# Patient Record
Sex: FEMALE | Race: WHITE | Employment: UNEMPLOYED | URBAN - NONMETROPOLITAN AREA
[De-identification: names, ages, dates, MRNs, and addresses within clinical notes are randomized per-mention and may not be internally consistent; named-entity substitution may affect disease eponyms.]

---
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
## 2021-09-24 ENCOUNTER — HOSPITAL ENCOUNTER (EMERGENCY)
Age: 21
Discharge: HOME | End: 2021-09-25
Attending: PEDIATRICS
Payer: MEDICAID

## 2021-09-24 VITALS
RESPIRATION RATE: 15 BRPM | DIASTOLIC BLOOD PRESSURE: 67 MMHG | TEMPERATURE: 98.2 F | WEIGHT: 164 LBS | HEART RATE: 106 BPM | HEIGHT: 64 IN | BODY MASS INDEX: 28 KG/M2 | OXYGEN SATURATION: 96 % | SYSTOLIC BLOOD PRESSURE: 139 MMHG

## 2021-09-24 DIAGNOSIS — S09.90XA INJURY OF HEAD, INITIAL ENCOUNTER: Primary | ICD-10-CM

## 2021-09-24 LAB
BILIRUBIN URINE: NEGATIVE
BLOOD, URINE: NEGATIVE
CLARITY: CLEAR
COLOR: YELLOW
GLUCOSE URINE: NEGATIVE MG/DL
HCG(URINE) PREGNANCY TEST: NEGATIVE
KETONES, URINE: NEGATIVE MG/DL
LEUKOCYTE ESTERASE, URINE: NEGATIVE
NITRITE, URINE: NEGATIVE
PH UA: 5.5 (ref 5–8)
PROTEIN UA: NEGATIVE MG/DL
SPECIFIC GRAVITY UA: 1.01 (ref 1–1.03)
UROBILINOGEN, URINE: 0.2 E.U./DL

## 2021-09-24 PROCEDURE — 99283 EMERGENCY DEPT VISIT LOW MDM: CPT

## 2021-09-24 PROCEDURE — 84703 CHORIONIC GONADOTROPIN ASSAY: CPT

## 2021-09-24 PROCEDURE — 81003 URINALYSIS AUTO W/O SCOPE: CPT

## 2021-09-24 PROCEDURE — 96365 THER/PROPH/DIAG IV INF INIT: CPT

## 2021-09-24 PROCEDURE — 6360000002 HC RX W HCPCS: Performed by: PEDIATRICS

## 2021-09-24 PROCEDURE — 2500000003 HC RX 250 WO HCPCS: Performed by: PEDIATRICS

## 2021-09-24 PROCEDURE — 96366 THER/PROPH/DIAG IV INF ADDON: CPT

## 2021-09-24 PROCEDURE — 96375 TX/PRO/DX INJ NEW DRUG ADDON: CPT

## 2021-09-24 PROCEDURE — 2580000003 HC RX 258: Performed by: PEDIATRICS

## 2021-09-24 PROCEDURE — 96372 THER/PROPH/DIAG INJ SC/IM: CPT

## 2021-09-24 RX ORDER — HYDROXYZINE HYDROCHLORIDE 25 MG/ML
25 INJECTION, SOLUTION INTRAMUSCULAR ONCE
Status: COMPLETED | OUTPATIENT
Start: 2021-09-24 | End: 2021-09-24

## 2021-09-24 RX ORDER — PAROXETINE 10 MG/1
10 TABLET, FILM COATED ORAL EVERY MORNING
COMMUNITY
Start: 2021-07-01

## 2021-09-24 RX ORDER — NORETHINDRONE ACETATE AND ETHINYL ESTRADIOL AND FERROUS FUMARATE 1MG-20(24)
KIT ORAL
COMMUNITY

## 2021-09-24 RX ORDER — LAMOTRIGINE 100 MG/1
100 TABLET ORAL DAILY
COMMUNITY

## 2021-09-24 RX ORDER — DIAZEPAM 10 MG/1
10 TABLET ORAL 2 TIMES DAILY PRN
COMMUNITY

## 2021-09-24 RX ORDER — ONDANSETRON 2 MG/ML
4 INJECTION INTRAMUSCULAR; INTRAVENOUS ONCE
Status: COMPLETED | OUTPATIENT
Start: 2021-09-24 | End: 2021-09-24

## 2021-09-24 RX ORDER — ZIPRASIDONE HYDROCHLORIDE 20 MG/1
20 CAPSULE ORAL 2 TIMES DAILY WITH MEALS
COMMUNITY

## 2021-09-24 RX ADMIN — HYDROXYZINE HYDROCHLORIDE 25 MG: 25 INJECTION, SOLUTION INTRAMUSCULAR at 23:22

## 2021-09-24 RX ADMIN — FOLIC ACID: 5 INJECTION, SOLUTION INTRAMUSCULAR; INTRAVENOUS; SUBCUTANEOUS at 23:23

## 2021-09-24 RX ADMIN — ONDANSETRON HYDROCHLORIDE 4 MG: 2 SOLUTION INTRAMUSCULAR; INTRAVENOUS at 23:17

## 2021-09-24 ASSESSMENT — PAIN DESCRIPTION - LOCATION: LOCATION: HEAD;CHEST

## 2021-09-24 ASSESSMENT — PAIN SCALES - GENERAL: PAINLEVEL_OUTOF10: 8

## 2021-09-24 ASSESSMENT — PAIN DESCRIPTION - DESCRIPTORS: DESCRIPTORS: PATIENT UNABLE TO DESCRIBE

## 2021-09-24 NOTE — ED TRIAGE NOTES
Fall at [facility] ER after having a \"panic attack and passing out\" Pt states the nurse at [facility] told that dr that she didn't pass out and hit her head, and a ct scan was not ordered. Pt c/o vomiting and nosebleed since, no nose bleed currently.  Pt  advised pt to come here for treatment

## 2021-09-25 PROCEDURE — 96366 THER/PROPH/DIAG IV INF ADDON: CPT

## 2021-09-27 ASSESSMENT — ENCOUNTER SYMPTOMS
BACK PAIN: 0
NAUSEA: 1
ABDOMINAL PAIN: 0
SHORTNESS OF BREATH: 0
COLOR CHANGE: 0
VOMITING: 1
EYE DISCHARGE: 0
COUGH: 0
RHINORRHEA: 0

## 2021-09-28 NOTE — ED PROVIDER NOTES
[facility]  eMERGENCY dEPARTMENT eNCOUnter      Pt Name: Hamilton Lundy  MRN: [id number]  [place] 2000  Date of evaluation: 9/24/2021  Provider: Tena Santos MD    CHIEF COMPLAINT       Chief Complaint   Patient presents with    Fall     see note         HISTORY OF PRESENT ILLNESS   (Location/Symptom, Timing/Onset,Context/Setting, Quality, Duration, Modifying Factors, Severity)  Note limiting factors. Hamilton Lundy is a 24 y.o. female who presents to the emergency department after fall. Patient states that she became dizzy while at [facility] and fell. This happened approximately 6 hours ago. Patient states that she hurts through the right side of her head. Patient states that she became anxious, dizzy, her vision went white prior to her falling. Patient denies passing out. Patient states she has been having nausea and vomiting x2. Patient states she remains anxious. Patient has been in the emergency department here for the past 4 hours. Patient denies difficulty walking or speaking, passing out, chest pain, difficulty breathing, or persistent changes in vision. HPI    NursingNotes were reviewed. REVIEW OF SYSTEMS    (2-9 systems for level 4, 10 or more for level 5)     Review of Systems   Constitutional: Negative for chills and fever. HENT: Negative for congestion and rhinorrhea. Eyes: Positive for visual disturbance (Transient. ). Negative for discharge. Respiratory: Negative for cough and shortness of breath. Cardiovascular: Negative for chest pain and palpitations. Gastrointestinal: Positive for nausea and vomiting. Negative for abdominal pain. Genitourinary: Negative for difficulty urinating and dysuria. Musculoskeletal: Negative for back pain and neck pain. Skin: Negative for color change and rash. Neurological: Positive for headaches. Negative for syncope and light-headedness.    Psychiatric/Behavioral: Negative for agitation, confusion and suicidal ideas. The patient is nervous/anxious. All other systems reviewed and are negative. PAST MEDICALHISTORY     Past Medical History:   Diagnosis Date    Bipolar 1 disorder ([address]     Depression     Endometriosis     Panic anxiety syndrome     PTSD (post-traumatic stress disorder)          SURGICAL HISTORY       Past Surgical History:   Procedure Laterality Date    ABDOMINAL EXPLORATION SURGERY           CURRENT MEDICATIONS     Discharge Medication List as of 9/25/2021  1:06 AM      CONTINUE these medications which have NOT CHANGED    Details   PARoxetine (PAXIL) 10 MG tablet Take 10 mg by mouth every morningHistorical Med      melatonin 3 MG TABS tablet Take 1 tablet by mouth nightly, Disp-30 tablet, R-1Normal      Norethin Ace-Eth Estrad-FE 1-20 MG-MCG(24) TABS Take by mouthHistorical Med      diazePAM (VALIUM) 10 MG tablet Take 10 mg by mouth 2 times daily as needed. Historical Med      lamoTRIgine (LAMICTAL) 100 MG tablet Take 100 mg by mouth dailyHistorical Med      ziprasidone (GEODON) 20 MG capsule Take 20 mg by mouth 2 times daily (with meals)Historical Med             ALLERGIES     Sulfamethoxazole-trimethoprim    FAMILY HISTORY     History reviewed. No pertinent family history.        SOCIAL HISTORY       Social History     Socioeconomic History    Marital status: Single     Spouse name: None    Number of children: None    Years of education: None    Highest education level: None   Occupational History    None   Tobacco Use    Smoking status: Current Every Day Smoker     Packs/day: 0.50     Types: E-Cigarettes, Cigarettes    Smokeless tobacco: Never Used   Substance and Sexual Activity    Alcohol use: Yes     Comment: rarely    Drug use: Not Currently    Sexual activity: Yes     Partners: Male   Other Topics Concern    None   Social History Narrative    None     Social Determinants of Health     Financial Resource Strain:     Difficulty of Paying Living Expenses: Food Insecurity:     Worried About Running Out of Food in the Last Year:     [address] in the Last Year:    Transportation Needs:     Lack of Transportation (Medical):  Lack of Transportation (Non-Medical):    Physical Activity:     Days of Exercise per Week:     Minutes of Exercise per Session:    Stress:     Feeling of Stress :    Social Connections:     Frequency of Communication with Friends and Family:     Frequency of Social Gatherings with Friends and Family:     Attends [employer] Services:     Active Member of Clubs or Organizations:     Attends Club or Organization Meetings:     Marital Status:    Intimate Partner Violence:     Fear of Current or Ex-Partner:     Emotionally Abused:     Physically Abused:     Sexually Abused:        SCREENINGS    Solo Coma Scale  Eye Opening: Spontaneous  Best Verbal Response: Oriented  Best Motor Response: Obeys commands  Solo Coma Scale Score: 15        PHYSICAL EXAM    (up to 7 for level 4, 8 or more for level 5)     ED Triage Vitals [09/24/21 1802]   BP Temp Temp Source Pulse Resp SpO2 Height Weight   139/67 98.2 °F (36.8 °C) Oral 106 15 96 % 5' 4\" (1.626 m) 164 lb (74.4 kg)       Physical Exam  Vitals and nursing note reviewed. Constitutional:       General: She is not in acute distress. Appearance: Normal appearance. HENT:      Head: Normocephalic. Comments: Contusion with minimal swelling overlying right parietal temporal region. No palpable deformity. Right Ear: External ear normal.      Left Ear: External ear normal.      Nose: Nose normal.      Mouth/Throat:      Mouth: Mucous membranes are moist.      Pharynx: Oropharynx is clear. No oropharyngeal exudate. Eyes:      General: No scleral icterus. Conjunctiva/sclera: Conjunctivae normal.      Pupils: Pupils are equal, round, and reactive to light. Cardiovascular:      Rate and Rhythm: Normal rate and regular rhythm. Pulses: Normal pulses.       Heart sounds: Normal heart sounds. Pulmonary:      Effort: Pulmonary effort is normal.      Breath sounds: Normal breath sounds. Abdominal:      General: Bowel sounds are normal.      Palpations: Abdomen is soft. Tenderness: There is no abdominal tenderness. There is no guarding. Musculoskeletal:         General: No tenderness or deformity. Cervical back: Neck supple. No rigidity. Skin:     General: Skin is warm and dry. Capillary Refill: Capillary refill takes less than 2 seconds. Coloration: Skin is not jaundiced. Neurological:      General: No focal deficit present. Mental Status: She is alert and oriented to person, place, and time. Mental status is at baseline. Coordination: Coordination normal.   Psychiatric:         Mood and Affect: Mood normal.         Behavior: Behavior normal.         DIAGNOSTIC RESULTS     RADIOLOGY:  Non-plain film images such as CT, Ultrasound and MRI are read by the radiologist. Plain radiographic images are visualized and preliminarily interpreted bythe emergency physician with the below findings:          No orders to display           LABS:  Labs Reviewed   PREGNANCY, URINE   URINE RT REFLEX TO CULTURE       All other labs were within normal range or not returned as of this dictation. EMERGENCY DEPARTMENT COURSE and DIFFERENTIAL DIAGNOSIS/MDM:   Vitals:    Vitals:    09/24/21 1802 09/24/21 2004   BP: 139/67    Pulse: 106 106   Resp: 15    Temp: 98.2 °F (36.8 °C)    TempSrc: Oral    SpO2: 96%    Weight: 164 lb (74.4 kg)    Height: 5' 4\" (1.626 m)        MDM  59-year-old female presents with right scalp contusion status post fall at [facility].  Lab results reviewed. Patient states that she believes she will get better after a banana bag IV. This was ordered along with fluids and Zofran. Patient states that she felt much better and was discharged to home.   CT was avoided at this time as risk of radiation to patient's brain outweigh benefit of CT scan. Patient is completely neurologically intact at this time. Patient will return with increasing or severe pain, difficulty walking or speaking, or other concerns. Patient verbalizes understanding and agreement with plan of care. CONSULTS:  None    PROCEDURES:  Unless otherwise noted below, none     Procedures    FINAL IMPRESSION      1.  Injury of head, initial encounter          DISPOSITION/PLAN   DISPOSITION Decision To Discharge 09/25/2021 01:05:43 AM      PATIENT REFERRED TO:  Diane Escamilla MD  [facility]  [phone]    Schedule an appointment as soon as possible for a visit         DISCHARGE MEDICATIONS:  Discharge Medication List as of 9/25/2021  1:06 AM             (Please note that portions of this note were completed with a voice recognition program.  Efforts were made to edit thedictations but occasionally words are mis-transcribed.)    Snow Dawson MD (electronically signed)  Attending Emergency Physician          Snow Dawson MD  09/27/21 9952

## 2024-01-30 ENCOUNTER — TELEPHONE (OUTPATIENT)
Dept: FAMILY MEDICINE CLINIC | Facility: CLINIC | Age: 24
End: 2024-01-30

## 2024-01-30 NOTE — TELEPHONE ENCOUNTER
Just wanting to verify, we would be able to see patient once she is changed to [employer] but we would not be able to see  daughter once she is born due to being new Medicaid patient

## 2024-01-30 NOTE — TELEPHONE ENCOUNTER
Caller: Xiomara Staples    Relationship: Self    Best call back number: [phone]     What is the best time to reach you: ANY    Who are you requesting to speak with (clinical staff, provider,  specific staff member): CLINICAL    What was the call regarding: CALLER STATED THAT HER INSURANCE WILL NO LONGER BE COVERED AT THIS OFFICE AND SHE WANTS A RECOMMENDATION FROM DR. LAGUNAS ON WHO SHE SHOULD SEE IN THE AREA FOR HER AND HER DAUGHTER WHO IS DUE IN MAY? PATIENT STATED THAT [employer], [employer] AND [place] [employer] ARE HER OPTIONS THAT SHE IS LOOKING AT RIGHT NOW. PATIENT WANTS TO KNOW SINCE SHE IS NOT A NEW PATIENT CAN WE STILL SEE HER  WITH [employer] MEDICAID? PATIENT STATED WHILE ON THE PHONE THAT SHE SIGNED UP FOR [employer] AND SAID THAT DR. LAGUNAS WAS IN NETWORK AND START DATE IS 3.1.24.    Is it okay if the provider responds through [facility]: NO, PLEASE CALL

## 2024-02-01 ENCOUNTER — APPOINTMENT (OUTPATIENT)
Dept: MRI IMAGING | Facility: HOSPITAL | Age: 24
End: 2024-02-01
Payer: COMMERCIAL

## 2024-02-01 ENCOUNTER — APPOINTMENT (OUTPATIENT)
Dept: NEUROLOGY | Facility: HOSPITAL | Age: 24
End: 2024-02-01
Payer: COMMERCIAL

## 2024-02-01 ENCOUNTER — HOSPITAL ENCOUNTER (OUTPATIENT)
Facility: HOSPITAL | Age: 24
Setting detail: OBSERVATION
Discharge: HOME | End: 2024-02-03
Attending: FAMILY MEDICINE | Admitting: FAMILY MEDICINE
Payer: COMMERCIAL

## 2024-02-01 ENCOUNTER — APPOINTMENT (OUTPATIENT)
Dept: CT IMAGING | Facility: HOSPITAL | Age: 24
End: 2024-02-01
Payer: COMMERCIAL

## 2024-02-01 DIAGNOSIS — R56.9 SEIZURE: Primary | ICD-10-CM

## 2024-02-01 DIAGNOSIS — Z34.90 PREGNANCY, UNSPECIFIED GESTATIONAL AGE: ICD-10-CM

## 2024-02-01 PROBLEM — Z34.92 SECOND TRIMESTER PREGNANCY: Status: ACTIVE | Noted: 2024-02-01

## 2024-02-01 PROBLEM — E87.6 HYPOKALEMIA: Status: ACTIVE | Noted: 2024-02-01

## 2024-02-01 LAB
ALBUMIN SERPL-MCNC: 3.4 G/DL (ref 3.5–5.2)
ALBUMIN/GLOB SERPL: 1.3 G/DL
ALP SERPL-CCNC: 57 U/L (ref 39–117)
ALT SERPL W P-5'-P-CCNC: 10 U/L (ref 1–33)
ANION GAP SERPL CALCULATED.3IONS-SCNC: 10 MMOL/L (ref 5–15)
APTT PPP: 25.3 SECONDS (ref 24.5–36)
AST SERPL-CCNC: 14 U/L (ref 1–32)
BACTERIA UR QL AUTO: ABNORMAL /HPF
BASOPHILS # BLD AUTO: 0.01 10*3/MM3 (ref 0–0.2)
BASOPHILS NFR BLD AUTO: 0.1 % (ref 0–1.5)
BILIRUB SERPL-MCNC: <0.2 MG/DL (ref 0–1.2)
BILIRUB UR QL STRIP: NEGATIVE
BUN SERPL-MCNC: 5 MG/DL (ref 6–20)
BUN/CREAT SERPL: 15.6 (ref 7–25)
CALCIUM SPEC-SCNC: 7.9 MG/DL (ref 8.6–10.5)
CHLORIDE SERPL-SCNC: 106 MMOL/L (ref 98–107)
CLARITY UR: CLEAR
CO2 SERPL-SCNC: 23 MMOL/L (ref 22–29)
COLOR UR: YELLOW
CREAT SERPL-MCNC: 0.32 MG/DL (ref 0.57–1)
DEPRECATED RDW RBC AUTO: 45.1 FL (ref 37–54)
EGFRCR SERPLBLD CKD-EPI 2021: 150.7 ML/MIN/1.73
EOSINOPHIL # BLD AUTO: 0.06 10*3/MM3 (ref 0–0.4)
EOSINOPHIL NFR BLD AUTO: 0.7 % (ref 0.3–6.2)
ERYTHROCYTE [DISTWIDTH] IN BLOOD BY AUTOMATED COUNT: 13.8 % (ref 12.3–15.4)
GLOBULIN UR ELPH-MCNC: 2.6 GM/DL
GLUCOSE SERPL-MCNC: 75 MG/DL (ref 65–99)
GLUCOSE UR STRIP-MCNC: NEGATIVE MG/DL
HCT VFR BLD AUTO: 30.2 % (ref 34–46.6)
HGB BLD-MCNC: 9.9 G/DL (ref 12–15.9)
HGB UR QL STRIP.AUTO: NEGATIVE
HYALINE CASTS UR QL AUTO: ABNORMAL /LPF
IMM GRANULOCYTES # BLD AUTO: 0.04 10*3/MM3 (ref 0–0.05)
IMM GRANULOCYTES NFR BLD AUTO: 0.4 % (ref 0–0.5)
INR PPP: 0.97 (ref 0.91–1.09)
KETONES UR QL STRIP: NEGATIVE
LEUKOCYTE ESTERASE UR QL STRIP.AUTO: ABNORMAL
LYMPHOCYTES # BLD AUTO: 1.56 10*3/MM3 (ref 0.7–3.1)
LYMPHOCYTES NFR BLD AUTO: 17.4 % (ref 19.6–45.3)
MAGNESIUM SERPL-MCNC: 2 MG/DL (ref 1.6–2.6)
MCH RBC QN AUTO: 29.1 PG (ref 26.6–33)
MCHC RBC AUTO-ENTMCNC: 32.8 G/DL (ref 31.5–35.7)
MCV RBC AUTO: 88.8 FL (ref 79–97)
MONOCYTES # BLD AUTO: 0.65 10*3/MM3 (ref 0.1–0.9)
MONOCYTES NFR BLD AUTO: 7.3 % (ref 5–12)
NEUTROPHILS NFR BLD AUTO: 6.62 10*3/MM3 (ref 1.7–7)
NEUTROPHILS NFR BLD AUTO: 74.1 % (ref 42.7–76)
NITRITE UR QL STRIP: NEGATIVE
NRBC BLD AUTO-RTO: 0 /100 WBC (ref 0–0.2)
PH UR STRIP.AUTO: 8 [PH] (ref 5–8)
PLATELET # BLD AUTO: 262 10*3/MM3 (ref 140–450)
PMV BLD AUTO: 9.9 FL (ref 6–12)
POTASSIUM SERPL-SCNC: 3 MMOL/L (ref 3.5–5.2)
PROT SERPL-MCNC: 6 G/DL (ref 6–8.5)
PROT UR QL STRIP: ABNORMAL
PROTHROMBIN TIME: 13 SECONDS (ref 11.8–14.8)
QT INTERVAL: 368 MS
QTC INTERVAL: 452 MS
RBC # BLD AUTO: 3.4 10*6/MM3 (ref 3.77–5.28)
RBC # UR STRIP: ABNORMAL /HPF
REF LAB TEST METHOD: ABNORMAL
SODIUM SERPL-SCNC: 139 MMOL/L (ref 136–145)
SP GR UR STRIP: 1.02 (ref 1–1.03)
SQUAMOUS #/AREA URNS HPF: ABNORMAL /HPF
TROPONIN T SERPL HS-MCNC: <6 NG/L
UROBILINOGEN UR QL STRIP: ABNORMAL
WBC # UR STRIP: ABNORMAL /HPF
WBC NRBC COR # BLD AUTO: 8.94 10*3/MM3 (ref 3.4–10.8)

## 2024-02-01 PROCEDURE — 85730 THROMBOPLASTIN TIME PARTIAL: CPT | Performed by: FAMILY MEDICINE

## 2024-02-01 PROCEDURE — G0378 HOSPITAL OBSERVATION PER HR: HCPCS

## 2024-02-01 PROCEDURE — 70544 MR ANGIOGRAPHY HEAD W/O DYE: CPT

## 2024-02-01 PROCEDURE — 70551 MRI BRAIN STEM W/O DYE: CPT

## 2024-02-01 PROCEDURE — 95714 VEEG EA 12-26 HR UNMNTR: CPT

## 2024-02-01 PROCEDURE — 99285 EMERGENCY DEPT VISIT HI MDM: CPT

## 2024-02-01 PROCEDURE — 85610 PROTHROMBIN TIME: CPT | Performed by: FAMILY MEDICINE

## 2024-02-01 PROCEDURE — 95816 EEG AWAKE AND DROWSY: CPT

## 2024-02-01 PROCEDURE — 36415 COLL VENOUS BLD VENIPUNCTURE: CPT

## 2024-02-01 PROCEDURE — 93005 ELECTROCARDIOGRAM TRACING: CPT

## 2024-02-01 PROCEDURE — 84484 ASSAY OF TROPONIN QUANT: CPT | Performed by: FAMILY MEDICINE

## 2024-02-01 PROCEDURE — 63710000001 DIPHENHYDRAMINE PER 50 MG: Performed by: INTERNAL MEDICINE

## 2024-02-01 PROCEDURE — 81001 URINALYSIS AUTO W/SCOPE: CPT | Performed by: FAMILY MEDICINE

## 2024-02-01 PROCEDURE — 25810000003 SODIUM CHLORIDE 0.9 % SOLUTION: Performed by: FAMILY MEDICINE

## 2024-02-01 PROCEDURE — 70547 MR ANGIOGRAPHY NECK W/O DYE: CPT

## 2024-02-01 PROCEDURE — 80053 COMPREHEN METABOLIC PANEL: CPT | Performed by: FAMILY MEDICINE

## 2024-02-01 PROCEDURE — 83735 ASSAY OF MAGNESIUM: CPT | Performed by: FAMILY MEDICINE

## 2024-02-01 PROCEDURE — 70450 CT HEAD/BRAIN W/O DYE: CPT

## 2024-02-01 PROCEDURE — 99214 OFFICE O/P EST MOD 30 MIN: CPT | Performed by: PSYCHIATRY & NEUROLOGY

## 2024-02-01 PROCEDURE — 95816 EEG AWAKE AND DROWSY: CPT | Performed by: PSYCHIATRY & NEUROLOGY

## 2024-02-01 PROCEDURE — 99204 OFFICE O/P NEW MOD 45 MIN: CPT | Performed by: OBSTETRICS & GYNECOLOGY

## 2024-02-01 PROCEDURE — 95720 EEG PHY/QHP EA INCR W/VEEG: CPT | Performed by: PSYCHIATRY & NEUROLOGY

## 2024-02-01 PROCEDURE — 85025 COMPLETE CBC W/AUTO DIFF WBC: CPT | Performed by: FAMILY MEDICINE

## 2024-02-01 RX ORDER — SODIUM CHLORIDE 9 MG/ML
40 INJECTION, SOLUTION INTRAVENOUS AS NEEDED
Status: DISCONTINUED | OUTPATIENT
Start: 2024-02-01 | End: 2024-02-03 | Stop reason: HOSPADM

## 2024-02-01 RX ORDER — SODIUM CHLORIDE 0.9 % (FLUSH) 0.9 %
10 SYRINGE (ML) INJECTION EVERY 12 HOURS SCHEDULED
Status: DISCONTINUED | OUTPATIENT
Start: 2024-02-01 | End: 2024-02-03 | Stop reason: HOSPADM

## 2024-02-01 RX ORDER — PRENATAL VIT/IRON FUM/FOLIC AC 27MG-0.8MG
1 TABLET ORAL DAILY
Status: DISCONTINUED | OUTPATIENT
Start: 2024-02-02 | End: 2024-02-03 | Stop reason: HOSPADM

## 2024-02-01 RX ORDER — ASPIRIN 81 MG/1
81 TABLET ORAL DAILY
Status: DISCONTINUED | OUTPATIENT
Start: 2024-02-02 | End: 2024-02-03 | Stop reason: HOSPADM

## 2024-02-01 RX ORDER — SODIUM CHLORIDE 0.9 % (FLUSH) 0.9 %
10 SYRINGE (ML) INJECTION AS NEEDED
Status: DISCONTINUED | OUTPATIENT
Start: 2024-02-01 | End: 2024-02-03 | Stop reason: HOSPADM

## 2024-02-01 RX ORDER — HYDROXYZINE HYDROCHLORIDE 25 MG/1
50 TABLET, FILM COATED ORAL EVERY 6 HOURS PRN
Status: DISCONTINUED | OUTPATIENT
Start: 2024-02-01 | End: 2024-02-03 | Stop reason: HOSPADM

## 2024-02-01 RX ORDER — FERROUS SULFATE 325(65) MG
325 TABLET ORAL 2 TIMES DAILY WITH MEALS
Status: DISCONTINUED | OUTPATIENT
Start: 2024-02-01 | End: 2024-02-03 | Stop reason: HOSPADM

## 2024-02-01 RX ORDER — FAMOTIDINE 20 MG/1
20 TABLET, FILM COATED ORAL
Status: DISCONTINUED | OUTPATIENT
Start: 2024-02-02 | End: 2024-02-03 | Stop reason: HOSPADM

## 2024-02-01 RX ORDER — ACETAMINOPHEN 325 MG/1
650 TABLET ORAL EVERY 4 HOURS PRN
Status: DISCONTINUED | OUTPATIENT
Start: 2024-02-01 | End: 2024-02-03 | Stop reason: HOSPADM

## 2024-02-01 RX ORDER — DOCUSATE SODIUM 100 MG/1
100 CAPSULE, LIQUID FILLED ORAL 2 TIMES DAILY
Status: DISCONTINUED | OUTPATIENT
Start: 2024-02-01 | End: 2024-02-03 | Stop reason: HOSPADM

## 2024-02-01 RX ORDER — POTASSIUM CHLORIDE 750 MG/1
40 CAPSULE, EXTENDED RELEASE ORAL EVERY 4 HOURS
Status: COMPLETED | OUTPATIENT
Start: 2024-02-01 | End: 2024-02-02

## 2024-02-01 RX ORDER — SODIUM CHLORIDE 9 MG/ML
50 INJECTION, SOLUTION INTRAVENOUS CONTINUOUS
Status: DISCONTINUED | OUTPATIENT
Start: 2024-02-01 | End: 2024-02-03 | Stop reason: HOSPADM

## 2024-02-01 RX ORDER — ASCORBIC ACID 500 MG
500 TABLET ORAL DAILY
Status: DISCONTINUED | OUTPATIENT
Start: 2024-02-02 | End: 2024-02-03 | Stop reason: HOSPADM

## 2024-02-01 RX ORDER — SUCRALFATE 1 G/1
1 TABLET ORAL 4 TIMES DAILY
Status: DISCONTINUED | OUTPATIENT
Start: 2024-02-01 | End: 2024-02-03 | Stop reason: HOSPADM

## 2024-02-01 RX ORDER — DIPHENHYDRAMINE HCL 25 MG
25 CAPSULE ORAL ONCE
Status: COMPLETED | OUTPATIENT
Start: 2024-02-01 | End: 2024-02-01

## 2024-02-01 RX ADMIN — Medication 10 ML: at 21:09

## 2024-02-01 RX ADMIN — FERROUS SULFATE TAB 325 MG (65 MG ELEMENTAL FE) 325 MG: 325 (65 FE) TAB at 23:19

## 2024-02-01 RX ADMIN — POTASSIUM CHLORIDE 40 MEQ: 10 CAPSULE, EXTENDED RELEASE ORAL at 21:08

## 2024-02-01 RX ADMIN — SODIUM CHLORIDE 50 ML/HR: 9 INJECTION, SOLUTION INTRAVENOUS at 21:59

## 2024-02-01 RX ADMIN — ACETAMINOPHEN 650 MG: 325 TABLET, FILM COATED ORAL at 21:09

## 2024-02-01 RX ADMIN — DIPHENHYDRAMINE HYDROCHLORIDE 25 MG: 25 CAPSULE ORAL at 23:19

## 2024-02-01 RX ADMIN — DOCUSATE SODIUM 100 MG: 100 CAPSULE, LIQUID FILLED ORAL at 21:09

## 2024-02-01 RX ADMIN — SUCRALFATE 1 G: 1 TABLET ORAL at 22:00

## 2024-02-01 NOTE — ED PROVIDER NOTES
Subjective   History of Present Illness  23-year-old female states that she has had 3 episodes over the last couple weeks.  She states that she gets these episodes where she is talking to someone and she will start speaking nonsense and not making sense.  Patient has no loss of consciousness.  Patient has no convulsions.  Patient states that her OB thought she might be having focal seizures and recommended she come to the emergency room for evaluation.  Patient states that today she was driving and she was talking to someone on the phone and then she just was not speaking to them while she was driving and then she states that she got back to talking after a few seconds and then came to the emergency room for evaluation.  Patient currently denies any symptoms at this time.  Patient states that she does still take Klonopin but she does not take it as prescribed.  Patient states that she does take small amounts as needed.      Review of Systems   All other systems reviewed and are negative.      Past Medical History:   Diagnosis Date    ADHD (attention deficit hyperactivity disorder)     Allergic     Anxiety     Asthma     Bipolar 1 disorder     Bulimia nervosa     COVID     Depression     Endometriosis     Endometriosis     GERD (gastroesophageal reflux disease)     History of medical problems     Panic disorder     PCOS (polycystic ovarian syndrome)     PTSD (post-traumatic stress disorder)     Urinary tract infection        Allergies   Allergen Reactions    Bactrim [Sulfamethoxazole-Trimethoprim] Rash    Sulfa Antibiotics Rash       Past Surgical History:   Procedure Laterality Date    ENDOMETRIAL ABLATION      EXPLORATORY LAPAROTOMY         Family History   Problem Relation Age of Onset    Suicidality Mother     No Known Problems Father        Social History     Socioeconomic History    Marital status:    Tobacco Use    Smoking status: Light Smoker     Types: Electronic Cigarette    Smokeless tobacco: Never    Substance and Sexual Activity    Alcohol use: Not Currently     Comment: Rarely    Drug use: No    Sexual activity: Yes     Partners: Female     Birth control/protection: None           Objective   Physical Exam  Vitals and nursing note reviewed.   Constitutional:       Appearance: Normal appearance.   HENT:      Head: Normocephalic and atraumatic.      Mouth/Throat:      Mouth: Mucous membranes are moist.   Eyes:      Extraocular Movements: Extraocular movements intact.      Pupils: Pupils are equal, round, and reactive to light.   Cardiovascular:      Rate and Rhythm: Normal rate and regular rhythm.      Heart sounds: Normal heart sounds.   Pulmonary:      Effort: Pulmonary effort is normal.      Breath sounds: Normal breath sounds.   Abdominal:      General: Bowel sounds are normal.      Palpations: Abdomen is soft.   Musculoskeletal:      Cervical back: Normal range of motion and neck supple. No tenderness.   Skin:     General: Skin is warm and dry.   Neurological:      General: No focal deficit present.      Mental Status: She is alert and oriented to person, place, and time.      Cranial Nerves: No cranial nerve deficit.      Sensory: No sensory deficit.      Motor: No weakness.      Coordination: Coordination normal.   Psychiatric:         Mood and Affect: Mood normal.         Behavior: Behavior normal.         Procedures           ED Course                                             Medical Decision Making  23-year-old female came in for questionable seizure-like activity.  Case was discussed with neurology who recommended admitting the patient for further observation.  I discussed the case with Dr. Bal Edmond who accepted the patient under their services.    Problems Addressed:  Pregnancy, unspecified gestational age: complicated acute illness or injury  Seizure: complicated acute illness or injury    Amount and/or Complexity of Data Reviewed  Labs: ordered. Decision-making details documented in ED  Course.  Radiology: ordered. Decision-making details documented in ED Course.  ECG/medicine tests: ordered. Decision-making details documented in ED Course.    Risk  Decision regarding hospitalization.      Lab Results (last 24 hours)       Procedure Component Value Units Date/Time    Urinalysis With Culture If Indicated - Urine, Clean Catch [[id number]]  (Abnormal) Collected: 02/01/24 1331    Specimen: Urine, Clean Catch Updated: 02/01/24 1353     Color, UA Yellow     Appearance, UA Clear     pH, UA 8.0     Specific Gravity, UA 1.021     Glucose, UA Negative     Ketones, UA Negative     Bilirubin, UA Negative     Blood, UA Negative     Protein, UA Trace     Leuk Esterase, UA Trace     Nitrite, UA Negative     Urobilinogen, UA 1.0 E.U./dL    Narrative:      In absence of clinical symptoms, the presence of pyuria, bacteria, and/or nitrites on the urinalysis result does not correlate with infection.    Urinalysis, Microscopic Only - Urine, Clean Catch [[id number]]  (Abnormal) Collected: 02/01/24 1331    Specimen: Urine, Clean Catch Updated: 02/01/24 1353     RBC, UA 0-2 /HPF      WBC, UA 0-2 /HPF      Comment: Urine culture not indicated.        Bacteria, UA 2+ /HPF      Squamous Epithelial Cells, UA 3-6 /HPF      Hyaline Casts, UA 3-6 /LPF      Methodology Automated Microscopy    CBC & Differential [[id number]]  (Abnormal) Collected: 02/01/24 1334    Specimen: Blood Updated: 02/01/24 1350    Narrative:      The following orders were created for panel order CBC & Differential.  Procedure                               Abnormality         Status                     ---------                               -----------         ------                     CBC Auto Differential[[id number]]        Abnormal            Final result                 Please view results for these tests on the individual orders.    Comprehensive Metabolic Panel [[id number]]  (Abnormal) Collected: 02/01/24 1334    Specimen: Blood Updated: 02/01/24 6735      Glucose 75 mg/dL      BUN 5 mg/dL      Creatinine 0.32 mg/dL      Sodium 139 mmol/L      Potassium 3.0 mmol/L      Chloride 106 mmol/L      CO2 23.0 mmol/L      Calcium 7.9 mg/dL      Total Protein 6.0 g/dL      Albumin 3.4 g/dL      ALT (SGPT) 10 U/L      AST (SGOT) 14 U/L      Alkaline Phosphatase 57 U/L      Total Bilirubin <0.2 mg/dL      Globulin 2.6 gm/dL      A/G Ratio 1.3 g/dL      BUN/Creatinine Ratio 15.6     Anion Gap 10.0 mmol/L      eGFR 150.7 mL/min/1.73     Narrative:      GFR Normal >60  Chronic Kidney Disease <60  Kidney Failure <15      Protime-INR [[id number]]  (Normal) Collected: 02/01/24 1334    Specimen: Blood Updated: 02/01/24 1508     Protime 13.0 Seconds      INR 0.97    aPTT [[id number]]  (Normal) Collected: 02/01/24 1334    Specimen: Blood Updated: 02/01/24 1508     PTT 25.3 seconds     Single High Sensitivity Troponin T [[id number]]  (Normal) Collected: 02/01/24 1334    Specimen: Blood Updated: 02/01/24 1411     HS Troponin T <6 ng/L     Narrative:      High Sensitive Troponin T Reference Range:  <14.0 ng/L- Negative Female for AMI  <22.0 ng/L- Negative Male for AMI  >=14 - Abnormal Female indicating possible myocardial injury.  >=22 - Abnormal Male indicating possible myocardial injury.   Clinicians would have to utilize clinical acumen, EKG, Troponin, and serial changes to determine if it is an Acute Myocardial Infarction or myocardial injury due to an underlying chronic condition.         Magnesium [[id number]]  (Normal) Collected: 02/01/24 1334    Specimen: Blood Updated: 02/01/24 1413     Magnesium 2.0 mg/dL     CBC Auto Differential [[id number]]  (Abnormal) Collected: 02/01/24 1334    Specimen: Blood Updated: 02/01/24 1350     WBC 8.94 10*3/mm3      RBC 3.40 10*6/mm3      Hemoglobin 9.9 g/dL      Hematocrit 30.2 %      MCV 88.8 fL      MCH 29.1 pg      MCHC 32.8 g/dL      RDW 13.8 %      RDW-SD 45.1 fl      MPV 9.9 fL      Platelets 262 10*3/mm3      Neutrophil % 74.1 %      Lymphocyte  % 17.4 %      Monocyte % 7.3 %      Eosinophil % 0.7 %      Basophil % 0.1 %      Immature Grans % 0.4 %      Neutrophils, Absolute 6.62 10*3/mm3      Lymphocytes, Absolute 1.56 10*3/mm3      Monocytes, Absolute 0.65 10*3/mm3      Eosinophils, Absolute 0.06 10*3/mm3      Basophils, Absolute 0.01 10*3/mm3      Immature Grans, Absolute 0.04 10*3/mm3      nRBC 0.0 /100 WBC           CT Head Without Contrast   Final Result   1. No acute intracranial abnormality is seen.               This report was signed and finalized on 2/1/2024 2:54 PM by Dr. Laureano Schwartz MD.              Final diagnoses:   Seizure   Pregnancy, unspecified gestational age       ED Disposition  ED Disposition       ED Disposition   Decision to Admit    Condition   --    Comment   Level of Care: Telemetry [5]   Diagnosis: Seizure [[id number]]   Admitting Physician: AGUSTO PENA [[id number]]   Attending Physician: AGUSTO PENA [[id number]]                 No follow-up provider specified.       Medication List      No changes were made to your prescriptions during this visit.            Rony Arrieta MD  02/01/24 6142

## 2024-02-01 NOTE — H&P
[facility] Medicine Services  HISTORY AND PHYSICAL    Date of Admission: 2/1/2024  Primary Care Physician: Serafin Green MD    Subjective   Primary Historian: Patient    Chief Complaint: Seizure like episodes    History of Present Illness  23 year old female with PMH of PTSD, panic attacks, 23 weeks pregnant that presents to the ER with complaints of daily episodes for the last 3 days where she would feel chest pressure, blurry vision and go into staring episodes for a few minutes, followed by disorientation and difficulty talking. She states this is new and different from her usual panic attacks.     Review of Systems   Otherwise complete ROS reviewed and negative except as mentioned in the HPI.    Past Medical History:   Past Medical History:   Diagnosis Date    ADHD (attention deficit hyperactivity disorder)     Allergic     Anxiety     Asthma     Bipolar 1 disorder     Bulimia nervosa     COVID     Depression     Endometriosis     Endometriosis     GERD (gastroesophageal reflux disease)     History of medical problems     Panic disorder     PCOS (polycystic ovarian syndrome)     PTSD (post-traumatic stress disorder)     Urinary tract infection      Past Surgical History:  Past Surgical History:   Procedure Laterality Date    ENDOMETRIAL ABLATION      EXPLORATORY LAPAROTOMY       Social History:  reports that she has been smoking electronic cigarette. She has never used smokeless tobacco. She reports that she does not currently use alcohol. She reports that she does not use drugs.    Family History: family history includes No Known Problems in her father; Suicidality in her mother.       Allergies:  Allergies   Allergen Reactions    Bactrim [Sulfamethoxazole-Trimethoprim] Rash    Sulfa Antibiotics Rash       Medications:  Prior to Admission medications    Medication Sig Start Date End Date Taking? Authorizing Provider   clonazePAM (KlonoPIN) 1 MG tablet Take 1 tablet  "by mouth. 2/16/23   Provider, MD Vernon           norgestimate-ethinyl estradiol (Estarylla) 0.25-35 MG-MCG per tablet Take 1 tablet by mouth Daily. 3/21/23   Norberto Apodaca APRN   ondansetron (Zofran) 4 MG tablet Take 1 tablet by mouth Every 8 (Eight) Hours As Needed for Vomiting. 9/27/23   Norberto Apodaca APRN   pantoprazole (Protonix) 40 MG EC tablet Take 1 tablet by mouth Daily. 9/27/23   Norberto Apodaca APRN   sucralfate (CARAFATE) 1 g tablet Take 1 tablet by mouth 4 (Four) Times a Day. 9/27/23   Norberto Apodaca APRN     I have utilized all available immediate resources to obtain, update, or review the patient's current medications (including all prescriptions, over-the-counter products, herbals, cannabis/cannabidiol products, and vitamin/mineral/dietary (nutritional) supplements).    Objective     Vital Signs: /61   Pulse 99   Temp 98.5 °F (36.9 °C)   Resp 18   Ht 160 cm (63\")   Wt 74.2 kg (163 lb 8 oz)   LMP 06/15/2023 Comment: has endometrosis  SpO2 100%   BMI 28.96 kg/m²   Physical Exam  Vitals reviewed.   Constitutional:       General: She is not in acute distress.     Appearance: She is well-developed. She is not toxic-appearing.   HENT:      Head: Normocephalic and atraumatic.      Right Ear: External ear normal.      Left Ear: External ear normal.      Mouth/Throat:      Mouth: Mucous membranes are dry.      Pharynx: Oropharynx is clear.   Eyes:      General:         Right eye: No discharge.         Left eye: No discharge.      Extraocular Movements: Extraocular movements intact.      Conjunctiva/sclera: Conjunctivae normal.      Pupils: Pupils are equal, round, and reactive to light.   Neck:      Vascular: No JVD.   Cardiovascular:      Rate and Rhythm: Normal rate and regular rhythm.      Pulses: Normal pulses.      Heart sounds: Normal heart sounds. No murmur heard.     No friction rub. No gallop.   Pulmonary:      Effort: Pulmonary effort is normal. No respiratory distress.      " Breath sounds: No stridor. No wheezing, rhonchi or rales.   Chest:      Chest wall: No tenderness.   Abdominal:      General: Bowel sounds are normal. There is no distension.      Palpations: Abdomen is soft.      Tenderness: There is no abdominal tenderness. There is no guarding or rebound.      Hernia: No hernia is present.   Musculoskeletal:         General: No swelling, tenderness or deformity. Normal range of motion.      Cervical back: Normal range of motion and neck supple. No rigidity or tenderness. No muscular tenderness.      Right lower leg: No edema.      Left lower leg: No edema.   Skin:     General: Skin is warm and dry.      Findings: No erythema or rash.   Neurological:      General: No focal deficit present.      Mental Status: She is alert and oriented to person, place, and time.      Cranial Nerves: No cranial nerve deficit.      Sensory: No sensory deficit.      Motor: No weakness or abnormal muscle tone.      Deep Tendon Reflexes: Reflexes normal.   Psychiatric:         Mood and Affect: Mood normal.         Behavior: Behavior normal.     Results Reviewed:  Lab Results (last 24 hours)       Procedure Component Value Units Date/Time    Protime-INR [[id number]]  (Normal) Collected: 02/01/24 1334    Specimen: Blood Updated: 02/01/24 1508     Protime 13.0 Seconds      INR 0.97    aPTT [[id number]]  (Normal) Collected: 02/01/24 1334    Specimen: Blood Updated: 02/01/24 1508     PTT 25.3 seconds     Comprehensive Metabolic Panel [[id number]]  (Abnormal) Collected: 02/01/24 1334    Specimen: Blood Updated: 02/01/24 1414     Glucose 75 mg/dL      BUN 5 mg/dL      Creatinine 0.32 mg/dL      Sodium 139 mmol/L      Potassium 3.0 mmol/L      Chloride 106 mmol/L      CO2 23.0 mmol/L      Calcium 7.9 mg/dL      Total Protein 6.0 g/dL      Albumin 3.4 g/dL      ALT (SGPT) 10 U/L      AST (SGOT) 14 U/L      Alkaline Phosphatase 57 U/L      Total Bilirubin <0.2 mg/dL      Globulin 2.6 gm/dL      A/G Ratio 1.3 g/dL       BUN/Creatinine Ratio 15.6     Anion Gap 10.0 mmol/L      eGFR 150.7 mL/min/1.73     Narrative:      GFR Normal >60  Chronic Kidney Disease <60  Kidney Failure <15      Magnesium [[id number]]  (Normal) Collected: 02/01/24 1334    Specimen: Blood Updated: 02/01/24 1413     Magnesium 2.0 mg/dL     Single High Sensitivity Troponin T [[id number]]  (Normal) Collected: 02/01/24 1334    Specimen: Blood Updated: 02/01/24 1411     HS Troponin T <6 ng/L     Narrative:      High Sensitive Troponin T Reference Range:  <14.0 ng/L- Negative Female for AMI  <22.0 ng/L- Negative Male for AMI  >=14 - Abnormal Female indicating possible myocardial injury.  >=22 - Abnormal Male indicating possible myocardial injury.   Clinicians would have to utilize clinical acumen, EKG, Troponin, and serial changes to determine if it is an Acute Myocardial Infarction or myocardial injury due to an underlying chronic condition.         Urinalysis With Culture If Indicated - Urine, Clean Catch [[id number]]  (Abnormal) Collected: 02/01/24 1331    Specimen: Urine, Clean Catch Updated: 02/01/24 1353     Color, UA Yellow     Appearance, UA Clear     pH, UA 8.0     Specific Gravity, UA 1.021     Glucose, UA Negative     Ketones, UA Negative     Bilirubin, UA Negative     Blood, UA Negative     Protein, UA Trace     Leuk Esterase, UA Trace     Nitrite, UA Negative     Urobilinogen, UA 1.0 E.U./dL    Narrative:      In absence of clinical symptoms, the presence of pyuria, bacteria, and/or nitrites on the urinalysis result does not correlate with infection.    Urinalysis, Microscopic Only - Urine, Clean Catch [[id number]]  (Abnormal) Collected: 02/01/24 1331    Specimen: Urine, Clean Catch Updated: 02/01/24 1353     RBC, UA 0-2 /HPF      WBC, UA 0-2 /HPF      Comment: Urine culture not indicated.        Bacteria, UA 2+ /HPF      Squamous Epithelial Cells, UA 3-6 /HPF      Hyaline Casts, UA 3-6 /LPF      Methodology Automated Microscopy    CBC & Differential  [[id number]]  (Abnormal) Collected: 02/01/24 1334    Specimen: Blood Updated: 02/01/24 1350    Narrative:      The following orders were created for panel order CBC & Differential.  Procedure                               Abnormality         Status                     ---------                               -----------         ------                     CBC Auto Differential[[id number]]        Abnormal            Final result                 Please view results for these tests on the individual orders.    CBC Auto Differential [[id number]]  (Abnormal) Collected: 02/01/24 1334    Specimen: Blood Updated: 02/01/24 1350     WBC 8.94 10*3/mm3      RBC 3.40 10*6/mm3      Hemoglobin 9.9 g/dL      Hematocrit 30.2 %      MCV 88.8 fL      MCH 29.1 pg      MCHC 32.8 g/dL      RDW 13.8 %      RDW-SD 45.1 fl      MPV 9.9 fL      Platelets 262 10*3/mm3      Neutrophil % 74.1 %      Lymphocyte % 17.4 %      Monocyte % 7.3 %      Eosinophil % 0.7 %      Basophil % 0.1 %      Immature Grans % 0.4 %      Neutrophils, Absolute 6.62 10*3/mm3      Lymphocytes, Absolute 1.56 10*3/mm3      Monocytes, Absolute 0.65 10*3/mm3      Eosinophils, Absolute 0.06 10*3/mm3      Basophils, Absolute 0.01 10*3/mm3      Immature Grans, Absolute 0.04 10*3/mm3      nRBC 0.0 /100 WBC           Imaging Results (Last 24 Hours)       Procedure Component Value Units Date/Time    CT Head Without Contrast [[id number]] Collected: 02/01/24 1453     Updated: 02/01/24 1457    Narrative:      EXAMINATION: CT HEAD WO CONTRAST-      2/1/2024 1:38 PM     HISTORY: syncope / near syncope     In order to have a CT radiation dose as low as reasonably achievable  Automated Exposure Control was utilized for adjustment of the mA and/or  KV according to patient size.     CT Dose DLP = 642.7 mGy.cm.  (If there are multiple studies performed at the same time this  represents the total dose).     Comparison:  None.     Axial, sagittal, and coronal noncontrast CT imaging of the  head.     The visualized paranasal sinuses are clear.     The brain and ventricles have an age appropriate appearance.      There is no hemorrhage or mass-effect.   No acute infarction is seen.     No calvarial abnormality.       Impression:      1. No acute intracranial abnormality is seen.           This report was signed and finalized on 2/1/2024 2:54 PM by Dr. Laureano Schwartz MD.             I have personally reviewed and interpreted the radiology studies and ECG obtained at time of admission.     Assessment / Plan   Assessment:   Active Hospital Problems    Diagnosis     **Seizure     Hypokalemia     Second trimester pregnancy     PTSD (post-traumatic stress disorder)      Treatment Plan  The patient will be admitted to my service here at [facility].   Admit to medical floor with telemetry and maternal care  Vitals every 4 hours with neuro checks  Regular diet  IVF NS 50 cc/hour  Seizure precautions  Neurology consult in ER recommending video EEG  Patient home medication list included Lyrica, but she states has never taken this medications  OBGYN for maternity care if required     Electrolyte replacement protocol    DVT prophylaxis > SCD    Medical Decision Making  Number and Complexity of problems: 4 complex medical problems  Differential Diagnosis: none    Conditions and Status        Condition is unchanged.     [facility] Data  External documents reviewed: Manga Corta  Cardiac tracing (EKG, telemetry) interpretation: Normal sinus rhythm   Radiology interpretation: See above  Labs reviewed: see above  Any tests that were considered but not ordered: none     Decision rules/scores evaluated (example [id number], Wells, etc): N/A     Discussed with: Patient and grandmother at bedside     Care Planning  Shared decision making: Patient  Code status and discussions: Full code    Disposition  Social Determinants of Health that impact treatment or disposition: none  Estimated length of stay is to be determined.      I confirmed that the patient's advanced care plan is present, code status is documented, and a surrogate decision maker is listed in the patient's medical record.     The patient's surrogate decision maker is Randy Holliday, father.     The patient was seen and examined by me on 2/1/2024 at 1545.    Electronically signed by Nain Bull MD, 02/01/24, 15:45 CST.

## 2024-02-02 ENCOUNTER — APPOINTMENT (OUTPATIENT)
Dept: ULTRASOUND IMAGING | Facility: HOSPITAL | Age: 24
End: 2024-02-02
Payer: COMMERCIAL

## 2024-02-02 ENCOUNTER — APPOINTMENT (OUTPATIENT)
Dept: NEUROLOGY | Facility: HOSPITAL | Age: 24
End: 2024-02-02
Payer: COMMERCIAL

## 2024-02-02 LAB
ANION GAP SERPL CALCULATED.3IONS-SCNC: 11 MMOL/L (ref 5–15)
BASOPHILS # BLD AUTO: 0.02 10*3/MM3 (ref 0–0.2)
BASOPHILS NFR BLD AUTO: 0.2 % (ref 0–1.5)
BUN SERPL-MCNC: 5 MG/DL (ref 6–20)
BUN/CREAT SERPL: 13.5 (ref 7–25)
CALCIUM SPEC-SCNC: 8.1 MG/DL (ref 8.6–10.5)
CHLORIDE SERPL-SCNC: 106 MMOL/L (ref 98–107)
CO2 SERPL-SCNC: 22 MMOL/L (ref 22–29)
CREAT SERPL-MCNC: 0.37 MG/DL (ref 0.57–1)
DEPRECATED RDW RBC AUTO: 45.3 FL (ref 37–54)
EGFRCR SERPLBLD CKD-EPI 2021: 145.5 ML/MIN/1.73
EOSINOPHIL # BLD AUTO: 0.09 10*3/MM3 (ref 0–0.4)
EOSINOPHIL NFR BLD AUTO: 1.1 % (ref 0.3–6.2)
ERYTHROCYTE [DISTWIDTH] IN BLOOD BY AUTOMATED COUNT: 13.8 % (ref 12.3–15.4)
GLUCOSE SERPL-MCNC: 66 MG/DL (ref 65–99)
HCT VFR BLD AUTO: 29.8 % (ref 34–46.6)
HGB BLD-MCNC: 9.6 G/DL (ref 12–15.9)
IMM GRANULOCYTES # BLD AUTO: 0.03 10*3/MM3 (ref 0–0.05)
IMM GRANULOCYTES NFR BLD AUTO: 0.4 % (ref 0–0.5)
LYMPHOCYTES # BLD AUTO: 1.85 10*3/MM3 (ref 0.7–3.1)
LYMPHOCYTES NFR BLD AUTO: 23.1 % (ref 19.6–45.3)
MCH RBC QN AUTO: 28.9 PG (ref 26.6–33)
MCHC RBC AUTO-ENTMCNC: 32.2 G/DL (ref 31.5–35.7)
MCV RBC AUTO: 89.8 FL (ref 79–97)
MONOCYTES # BLD AUTO: 0.65 10*3/MM3 (ref 0.1–0.9)
MONOCYTES NFR BLD AUTO: 8.1 % (ref 5–12)
NEUTROPHILS NFR BLD AUTO: 5.38 10*3/MM3 (ref 1.7–7)
NEUTROPHILS NFR BLD AUTO: 67.1 % (ref 42.7–76)
NRBC BLD AUTO-RTO: 0 /100 WBC (ref 0–0.2)
PLATELET # BLD AUTO: 284 10*3/MM3 (ref 140–450)
PMV BLD AUTO: 10.5 FL (ref 6–12)
POTASSIUM SERPL-SCNC: 3.7 MMOL/L (ref 3.5–5.2)
POTASSIUM SERPL-SCNC: 4 MMOL/L (ref 3.5–5.2)
RBC # BLD AUTO: 3.32 10*6/MM3 (ref 3.77–5.28)
SODIUM SERPL-SCNC: 139 MMOL/L (ref 136–145)
WBC NRBC COR # BLD AUTO: 8.02 10*3/MM3 (ref 3.4–10.8)

## 2024-02-02 PROCEDURE — 95720 EEG PHY/QHP EA INCR W/VEEG: CPT | Performed by: PSYCHIATRY & NEUROLOGY

## 2024-02-02 PROCEDURE — 99214 OFFICE O/P EST MOD 30 MIN: CPT | Performed by: PSYCHIATRY & NEUROLOGY

## 2024-02-02 PROCEDURE — 84132 ASSAY OF SERUM POTASSIUM: CPT | Performed by: FAMILY MEDICINE

## 2024-02-02 PROCEDURE — G0378 HOSPITAL OBSERVATION PER HR: HCPCS

## 2024-02-02 PROCEDURE — 99213 OFFICE O/P EST LOW 20 MIN: CPT | Performed by: OBSTETRICS & GYNECOLOGY

## 2024-02-02 PROCEDURE — 95714 VEEG EA 12-26 HR UNMNTR: CPT

## 2024-02-02 PROCEDURE — 36415 COLL VENOUS BLD VENIPUNCTURE: CPT | Performed by: FAMILY MEDICINE

## 2024-02-02 PROCEDURE — 85025 COMPLETE CBC W/AUTO DIFF WBC: CPT | Performed by: FAMILY MEDICINE

## 2024-02-02 PROCEDURE — 80048 BASIC METABOLIC PNL TOTAL CA: CPT | Performed by: FAMILY MEDICINE

## 2024-02-02 PROCEDURE — 76805 OB US >/= 14 WKS SNGL FETUS: CPT

## 2024-02-02 PROCEDURE — 25810000003 SODIUM CHLORIDE 0.9 % SOLUTION: Performed by: FAMILY MEDICINE

## 2024-02-02 RX ORDER — FAMOTIDINE 20 MG/1
20 TABLET, FILM COATED ORAL 2 TIMES DAILY PRN
COMMUNITY

## 2024-02-02 RX ADMIN — SUCRALFATE 1 G: 1 TABLET ORAL at 08:07

## 2024-02-02 RX ADMIN — FERROUS SULFATE TAB 325 MG (65 MG ELEMENTAL FE) 325 MG: 325 (65 FE) TAB at 08:07

## 2024-02-02 RX ADMIN — HYDROXYZINE HYDROCHLORIDE 50 MG: 25 TABLET, FILM COATED ORAL at 10:38

## 2024-02-02 RX ADMIN — POTASSIUM CHLORIDE 40 MEQ: 10 CAPSULE, EXTENDED RELEASE ORAL at 04:56

## 2024-02-02 RX ADMIN — HYDROXYZINE HYDROCHLORIDE 50 MG: 25 TABLET, FILM COATED ORAL at 16:37

## 2024-02-02 RX ADMIN — SUCRALFATE 1 G: 1 TABLET ORAL at 12:10

## 2024-02-02 RX ADMIN — PRENATAL VIT W/ FE FUMARATE-FA TAB 27-0.8 MG 1 TABLET: 27-0.8 TAB at 08:07

## 2024-02-02 RX ADMIN — ACETAMINOPHEN 650 MG: 325 TABLET, FILM COATED ORAL at 03:59

## 2024-02-02 RX ADMIN — FAMOTIDINE 20 MG: 20 TABLET, FILM COATED ORAL at 08:07

## 2024-02-02 RX ADMIN — DOCUSATE SODIUM 100 MG: 100 CAPSULE, LIQUID FILLED ORAL at 08:07

## 2024-02-02 RX ADMIN — DOCUSATE SODIUM 100 MG: 100 CAPSULE, LIQUID FILLED ORAL at 21:00

## 2024-02-02 RX ADMIN — FERROUS SULFATE TAB 325 MG (65 MG ELEMENTAL FE) 325 MG: 325 (65 FE) TAB at 18:41

## 2024-02-02 RX ADMIN — OXYCODONE HYDROCHLORIDE AND ACETAMINOPHEN 500 MG: 500 TABLET ORAL at 08:07

## 2024-02-02 RX ADMIN — SODIUM CHLORIDE 50 ML/HR: 9 INJECTION, SOLUTION INTRAVENOUS at 18:41

## 2024-02-02 RX ADMIN — Medication 10 ML: at 08:07

## 2024-02-02 RX ADMIN — SUCRALFATE 1 G: 1 TABLET ORAL at 18:41

## 2024-02-02 RX ADMIN — POTASSIUM CHLORIDE 40 MEQ: 10 CAPSULE, EXTENDED RELEASE ORAL at 00:41

## 2024-02-02 RX ADMIN — SUCRALFATE 1 G: 1 TABLET ORAL at 20:59

## 2024-02-02 NOTE — CONSULTS
[facility]  HISTORY AND PHYSICAL, OBGYN    Patient Name: Xiomara Holliday  : 2000  MRN: [id number]  Primary Care Physician:  Serafin Green MD  Date of admission: 2024    Subjective   Subjective     Chief Complaint:   Chief Complaint   Patient presents with    Seizures       HPI: Xiomara Holliday is a 23 y.o. year old  with an Estimated Date of Delivery: 24 currently at 23w0d, who has been admitted for evaluation of spells of starring and confusion. Neurology has bene consulted and is evaluating at this time. At bedside, patient reports doing well so far. She endorses appropriate fetal movement. She denies contractions, leakage of fluid or vaginal bleeding. She reports prenatal care this prenatal care with midwife Dloly Jonas in [place], [place]. She reports that until recently, her pregnancy has been fairly uncomplicated. She does note worsening heartburn that she is currently experiencing. She reports a normal anatomy ultrasound with a female fetus. SANDRA is 2024 by LMP, consistent with early ultrasound. Her chart has been reviewed. In [facility], she has had prenatal care with Dolly Jonas. She does have a significant psych history and has been followed by Psychiatry, Rui Salamanca, for Bipolar 2 disorder, PTSD, and Panic disorder. Available records have been reviewed and a mood stabilizer had been recommended, which she declined. She has continued clonazepam as needed per chart review, which Psychiatry has advised against. Last use reported two days ago. On chart review, it appears that once she became pregnant, she took herself off of all medications except for Klonopin. She reports that prior to pregnancy, she has been on multiple medications for mood (chart reviewed as well): lamotrigine, lamictal, paroxetine, cymbalta, vistaril, lexapro, quetiapine, latuda, trazodone, lithium, zyprexa, clonidine, ativan. She does deny alcohol, tobacco use, and  recreational drug use. She does have a pack of cigarettes in her purse that is visible.     ROS:  All systems were reviewed and negative except for:   -endorses appropriate fetal movement  -denies contractions, leakage of fluid or vaginal bleeding      Personal History     OB History    Para Term  AB Living   3 0 0 0 2 0   SAB IAB Ectopic Molar Multiple Live Births   2 0 0 0 0 0      # Outcome Date GA Lbr Blaine/2nd Weight Sex Delivery Anes PTL Lv   3 Current            2 SAB            1 SAB                Past Medical History:   Diagnosis Date    ADHD (attention deficit hyperactivity disorder)     Allergic     Anxiety     Asthma     Bipolar 1 disorder     Bulimia nervosa     COVID     Depression     Endometriosis     Endometriosis     GERD (gastroesophageal reflux disease)     History of medical problems     Panic disorder     PCOS (polycystic ovarian syndrome)     PTSD (post-traumatic stress disorder)     Urinary tract infection        Past Surgical History:   Procedure Laterality Date    ENDOMETRIAL ABLATION      EXPLORATORY LAPAROTOMY         Family History: family history includes No Known Problems in her father; Suicidality in her mother. Otherwise pertinent FHx was reviewed and not pertinent to current issue.    Social History:  reports that she has been smoking electronic cigarette. She has never used smokeless tobacco. She reports that she does not currently use alcohol. She reports current drug use. Drug: Marijuana.    Home Medications:  clonazePAM, lamoTRIgine, ondansetron, pantoprazole, and sucralfate    Allergies:  Allergies   Allergen Reactions    Bactrim [Sulfamethoxazole-Trimethoprim] Rash    Sulfa Antibiotics Rash       Objective   Objective     Vitals:   Temp:  [98.5 °F (36.9 °C)] 98.5 °F (36.9 °C)  Heart Rate:  [86-99] 97  Resp:  [18] 18  BP: ()/(58-83) 110/70    Physical Exam     General: Well Developed, Well Nourished, not in acute distress   HEENT: normocephalic, atraumatic,  conjunctiva non-icteric    Respiratory: non-labored, symmetrical chest rise   Gastrointestinal: gravid, soft, no TTP, no rebound tenderness or guarding to palpation, no palpable ctx   Neurologic: alert and oriented, CN II-XII grossly intact without focal deficit, DTRs 2+ lower extremities, no clonus   Psychiatric: normal mood, pleasant, cooperative  Musculoskeletal: negative calf tenderness, no lower extremity edema; SCDs on bilaterally  Skin: warm & dry, no cyanosis, no jaundice    Pelvic Exam:  deferred    Electronic Fetal Monitoring  Doppler: 141 bpm, bedside    Prenatal Labs  Lab Results   Component Value Date    HGB 9.9 (L) 02/01/2024    ABSCRN Negative 09/19/2023    URINECX <25,000 CFU/mL Normal Urogenital Adelaida 02/19/2023    CHLAMNAA Not Detected 02/19/2023    NGONORRHON Not Detected 02/19/2023         Result Review    Result Review:  I have personally reviewed the results from the time of this admission to 2/1/2024 21:11 CST and agree with these findings:  [x]  Laboratory list / accordion  [x]  Microbiology  [x]  Radiology  []  EKG/Telemetry   []  Cardiology/Vascular   []  Pathology  []  Old records  []  Other:  Most notable findings include:   Hgb/Hct decreased  Platelets normal  UA with trace protein, not indicative of acute UTI  Hypokalemia noted  Creatinine ok for pregnancy  Remaining albs unremarkable    MRI Angiogram Neck Without Contrast    Result Date: 2/1/2024  Impression: 1. Unremarkable MRA of the neck.     This report was signed and finalized on 2/1/2024 9:01 PM by Khai Alcala.      MRI Angiogram Head Without Contrast    Result Date: 2/1/2024  Normal noncontrast MR angiogram of the brain.    This report was signed and finalized on 2/1/2024 8:11 PM by Khai Alcala.      MRI Brain Without Contrast    Result Date: 2/1/2024  Impression:  No acute intracranial abnormality or intracranial mass lesion. No evidence of mesotemporal sclerosis, cortical dysplasia, or heterotopia.   This report was  signed and finalized on 2024 8:07 PM by Khai Alcala.      CT Head Without Contrast    Result Date: 2024  1. No acute intracranial abnormality is seen.    This report was signed and finalized on 2024 2:54 PM by Dr. Laureano Schwartz MD.         Assessment & Plan   Assessment / Plan     Xiomara Holliday is a 23 y.o. year old  with an Estimated Date of Delivery: 24 who was admitted for further evaluation and monitoring of her spells of starring and confusion. Neurology is following and evaluating at this time. Currently, labs and normal blood pressures are not consistent with preeclampsia. Patient does endorse that these spells are different from her typical Panic attacks. If antiepileptics are needed, would recommend Lamotrigine or Keppra during pregnancy.  Would avoid Klonopin during pregnancy.     Pregnancy, 23w0d  -inpatient medications reviewed  -prenatal vitamin, low dose aspirin for preeclampsia prevention, and Pepcid added  -Fetal heart tones each shift while here via bedside doppler  -If with signs/symptoms concerning for  labor, she will need to be transferred to L&D for evaluation with possible transfer out to a higher level of care secondary to early gestational age  -Fetal ultrasound in the morning for growth  -Recommend at time of discharge patient have follow-up with an obstetrician for the remainder of her pregnancy. With her use of benzodiazepines during pregnancy, this increases her risk of teratogenicity (which she does report a normal anatomy ultrasound), as well as the increased risk of  birth, low birth rate (studies are mixed), and  effects including   apnea, hypothermia, lethargy, restlessness, poor feeding. With her multiple psychiatric diagnoses, continuing pregnancy with an OBGYN and Psychiatrist is advised. Patient to follow-up outpatient with her midwife in [place]. She has declined OBGYN follow-up with a physician. She was  encouraged to reconsider.     Spell of altered Consciousness  -Neurology following and continuing to evaluate    Hypokalemia   -potassium replacement as ordered    Anemia in Pregnancy  -start PO iron supplement    PTSD, Panic disorder, Bipolar 2 disorder  -atarax PRN for anxiety, would avoid benzodiazepines during pregnancy and during evaluation/EEG at this time  -if she is to remain inpatient, would consider Psychiatry consult for evaluation and assistance in care  -advised against benzodiazepine use in pregnancy and consideration of a mood stabilizer instead, in congruency with Psychiatry's recommendations.     Disposition: OBGYN will continue to assist as needed for her prenatal care while here in the hospital. I do expect that she will be discharged soon as her evaluation here has been otherwise negative. Please call us with any questions.     Elmer Flores MD  2/1/2024  21:11 CST

## 2024-02-02 NOTE — PROGRESS NOTES
[facility] Medicine Services  INPATIENT PROGRESS NOTE    Patient Name: Xiomara Holliday  Date of Admission: 2/1/2024  Today's Date: 02/02/24  Length of Stay: 0  Primary Care Physician: Serafin Green MD    Subjective   Chief Complaint: seizure spells  HPI   Complained of panic this afternoon, no further spells.     Review of Systems   All pertinent negatives and positives are as above. All other systems have been reviewed and are negative unless otherwise stated.     Objective    Temp:  [98 °F (36.7 °C)-98.6 °F (37 °C)] 98.1 °F (36.7 °C)  Heart Rate:  [] 107  Resp:  [16-18] 18  BP: ()/(50-83) 109/66  Physical Exam  Vitals reviewed.   Constitutional:       General: She is not in acute distress.     Appearance: She is well-developed. She is not toxic-appearing.   HENT:      Head: Normocephalic and atraumatic.      Right Ear: External ear normal.      Left Ear: External ear normal.      Mouth/Throat:      Mouth: Mucous membranes are dry.      Pharynx: Oropharynx is clear.   Eyes:      General:         Right eye: No discharge.         Left eye: No discharge.      Extraocular Movements: Extraocular movements intact.      Conjunctiva/sclera: Conjunctivae normal.      Pupils: Pupils are equal, round, and reactive to light.   Neck:      Vascular: No JVD.   Cardiovascular:      Rate and Rhythm: Normal rate and regular rhythm.      Pulses: Normal pulses.      Heart sounds: Normal heart sounds. No murmur heard.     No friction rub. No gallop.   Pulmonary:      Effort: Pulmonary effort is normal. No respiratory distress.      Breath sounds: No stridor. No wheezing, rhonchi or rales.   Chest:      Chest wall: No tenderness.   Abdominal:      General: Bowel sounds are normal. There is no distension.      Palpations: Abdomen is soft.      Tenderness: There is no abdominal tenderness. There is no guarding or rebound.      Hernia: No hernia is present.   Musculoskeletal:   "       General: No swelling, tenderness or deformity. Normal range of motion.      Cervical back: Normal range of motion and neck supple. No rigidity or tenderness. No muscular tenderness.      Right lower leg: No edema.      Left lower leg: No edema.   Skin:     General: Skin is warm and dry.      Findings: No erythema or rash.   Neurological:      General: No focal deficit present.      Mental Status: She is alert and oriented to person, place, and time.      Cranial Nerves: No cranial nerve deficit.      Sensory: No sensory deficit.      Motor: No weakness or abnormal muscle tone.      Deep Tendon Reflexes: Reflexes normal.   Psychiatric:         Mood and Affect: Mood normal.         Behavior: Behavior normal.     Results Review:  I have reviewed the labs, radiology results, and diagnostic studies.    Laboratory Data:   Results from last 7 days   Lab Units 02/02/24  0329 02/01/24  1334   WBC 10*3/mm3 8.02 8.94   HEMOGLOBIN g/dL 9.6* 9.9*   HEMATOCRIT % 29.8* 30.2*   PLATELETS 10*3/mm3 284 262        Results from last 7 days   Lab Units 02/02/24  0926 02/02/24  0329 02/01/24  1334   SODIUM mmol/L  --  139 139   POTASSIUM mmol/L 4.0 3.7 3.0*   CHLORIDE mmol/L  --  106 106   CO2 mmol/L  --  22.0 23.0   BUN mg/dL  --  5* 5*   CREATININE mg/dL  --  0.37* 0.32*   CALCIUM mg/dL  --  8.1* 7.9*   BILIRUBIN mg/dL  --   --  <0.2   ALK PHOS U/L  --   --  57   ALT (SGPT) U/L  --   --  10   AST (SGOT) U/L  --   --  14   GLUCOSE mg/dL  --  66 75       Culture Data:   No results found for: \"BLOODCX\", \"URINECX\", \"WOUNDCX\", \"MRSACX\", \"RESPCX\", \"STOOLCX\"    Radiology Data:   Imaging Results (Last 24 Hours)       Procedure Component Value Units Date/Time    US Ob 14 + Weeks Single or First Gestation [[id number]] Collected: 02/02/24 1455     Updated: 02/02/24 1502    Narrative:      EXAMINATION: US OB 14 + WEEKS SINGLE OR FIRST GESTATION-     2/2/2024 11:50 AM     HISTORY: pregnancy, growth; R56.9-Unspecified " convulsions;  Z34.90-Encounter for supervision of normal pregnancy, unspecified,  unspecified trimester     Grayscale, color-flow, and Doppler fetal ultrasound evaluation.     Single intrauterine fetus with documented heartbeat.     Fetal heart rate = 141-145 bpm.     The fetus shows cephalic positioning.     RIGHT lateral position of the placenta.     The single largest pocket of fluid measures 4.57 cm.     Gestational age = 23 weeks 1 day.  Ultrasound measurements correlate with an age of 23 weeks 6 days.     Estimated fetal weight = 709.5 g.       Impression:      1. Second trimester pregnancy.  2. Documented fetal heart beat and amniotic fluid.           This report was signed and finalized on 2/2/2024 2:59 PM by Dr. Laureano Schwartz MD.       MRI Angiogram Venogram Head [[id number]] Collected: 02/01/24 2101     Updated: 02/01/24 2113    Narrative:      MRI ANGIOGRAM VENOGRAM HEAD- 2/1/2024 6:00 PM     HISTORY: Dural venous sinus thrombosis suspected; R56.9-Unspecified  convulsions; Z34.90-Encounter for supervision of normal pregnancy,  unspecified, unspecified trimester     TECHNIQUE:  Technique: 2-D time-of-flight images were obtained of the  intracranial venous sinuses. Reconstructed images were then obtained in  the coronal and sagittal projections.     Comparison: None.     FINDINGS: ?     There is normal flow signal within the superior sagittal sinus, left  transverse sinus, and bilateral sigmoid sinuses. The right transverse  sinus demonstrates absence of flow signal and appears to be hypoplastic  on sagittal T1-weighted images. The straight sinus, vein of Wilder, and  bilateral internal cerebral veins are patent. The inferior sagittal  sinus demonstrates normal flow signal. The superficial cerebral veins  appear to demonstrate normal flow signal.       Impression:         No convincing evidence of a dural venous sinus thrombosis.     Hypoplastic appearance of the right transverse sinus without  flow  signal.     This report was signed and finalized on 2/1/2024 9:10 PM by Khai Alcala.       MRI Angiogram Neck Without Contrast [[id number]] Collected: 02/01/24 2057     Updated: 02/01/24 2104    Narrative:      MRI ANGIOGRAM NECK WO CONTRAST- 2/1/2024 5:49 PM     CLINICAL: Neuro deficit, acute, stroke suspected; R56.9-Unspecified  convulsions; Z34.90-Encounter for supervision of normal pregnancy,  unspecified, unspecified trimester     COMPARISON: NONE      Technique: 3D TOF SPGR images were obtained without contrast with 3-D  and MIP post processing and reconstruction.      Findings:   The visualized aortic arch and proximal great vessels are unremarkable.      The right common carotid artery, right internal carotid artery, and  right external carotid artery are unremarkable. No aneurysm or  occlusion. No flow-limiting lesion.      The left common carotid artery, left internal carotid artery, and left  external carotid artery are unremarkable. No aneurysm or occlusion. No  flow-limiting lesion.        Impression:      Impression:   1. Unremarkable MRA of the neck.               This report was signed and finalized on 2/1/2024 9:01 PM by Khai Alcala.       MRI Angiogram Head Without Contrast [[id number]] Collected: 02/01/24 2008     Updated: 02/01/24 2014    Narrative:      MRI ANGIOGRAM HEAD WO CONTRAST- 2/1/2024 6:16 PM     HISTORY: Neuro deficit, acute, stroke suspected; R56.9-Unspecified  convulsions; Z34.90-Encounter for supervision of normal pregnancy,  unspecified, unspecified trimester     TECHNIQUE: Noncontrast 3-D time-of-flight imaging was performed through  the [place] [facility] [place]. 3-D and maximum intensity projection (MIP) images  were created from the axial source data.      COMPARISON: None.     FINDINGS:      Distal internal carotid arteries are normal in caliber. Bilateral  anterior and middle cerebral arteries are patent and symmetric in  caliber. Intracranial vertebral arteries and  basilar artery are normal  in caliber. The posterior cerebral arteries are patent and symmetric. No  visualized aneurysm or vascular malformation.       Impression:      Normal noncontrast MR angiogram of the brain.            This report was signed and finalized on 2/1/2024 8:11 PM by Khai Alcala.       MRI Brain Without Contrast [[id number]] Collected: 02/01/24 2003     Updated: 02/01/24 2011    Narrative:      Exam: MRI BRAIN WO CONTRAST- 2/1/2024 6:17 PM     History: Seizure disorder, clinical change; R56.9-Unspecified  convulsions; Z34.90-Encounter for supervision of normal pregnancy,  unspecified, unspecified trimester     Technique: Multisequence, multiplanar MRI of the brain was performed  without intravenous contrast.      Contrast: None.     Comparison: None     Findings:      Ventricles and extra-axial CSF spaces are normal in size. Major vascular  flow voids are preserved.     No abnormal intracranial parenchymal FLAIR signal abnormality. No  abnormal susceptibility artifact. No abnormal diffusion restriction. No  evidence of mesial temporal sclerosis or cortical dysplasia. No visible  heterotopia.     Sellar/parasellar structures are grossly unremarkable. No tonsillar  ectopia.     Orbits are grossly unremarkable. Paranasal sinuses are grossly clear.  Mastoid air cells are grossly clear.     No suspicious calvarial or extracranial soft tissue abnormality.       Impression:      Impression:     No acute intracranial abnormality or intracranial mass lesion. No  evidence of mesotemporal sclerosis, cortical dysplasia, or heterotopia.        This report was signed and finalized on 2/1/2024 8:07 PM by Khai Alcala.               I have reviewed the patient's current medications.     Assessment/Plan   Assessment  Active Hospital Problems    Diagnosis     **Seizure     Hypokalemia     Second trimester pregnancy     PTSD (post-traumatic stress disorder)      Treatment Plan  Ongoing continuous video EEG.    Neurology input noted  OBGYN input noted  MRI/MRA brain imaging unremarkable  Regular diet  Continue to monitor for decision regarding need or indication for anticonvulsants  Hypokalemia replaced    Medical Decision Making  Number and Complexity of problems: 4 complex problems  Differential Diagnosis: None    Conditions and Status        Condition is unchanged.     [facility] Data  External documents reviewed: Live [employer]  Cardiac tracing (EKG, telemetry) interpretation: -  Radiology interpretation: See above  Labs reviewed: see above  Any tests that were considered but not ordered: none     Decision rules/scores evaluated (example [id number], Wells, etc): N/A     Discussed with: Patient     Care Planning  Shared decision making: Patient  Code status and discussions: Full code    Disposition  Social Determinants of Health that impact treatment or disposition: none  I expect the patient to be discharged to home in 1-2 days.     Electronically signed by Nain Bull MD, 02/02/24, 16:29 CST.

## 2024-02-02 NOTE — PROGRESS NOTES
[facility]     Progress Note    Patient Name: Xiomara Holliday  : 2000  MRN: [id number]  Primary Care Physician:  Serafin Green MD  Date of admission: 2024    Subjective   Subjective     Chief Complaint: Seizure disorder    History of Present Illness  Patient seen and examined.  Events noted.  Chart reviewed.  Discussed with nursing staff on the floor as well as nursing staff on labor and delivery.    Briefly, patient was admitted for possible seizure activity and is on continuous EEG monitoring.  She also happens to be pregnant at 23 weeks.  Today, she is reporting active fetal movement, no vaginal bleeding, and no gushes of fluid.  She does report some lower abdominal and pelvic cramping which is not severe.    Patient Reports no other issues    Review of Systems   Gastrointestinal:  Positive for abdominal pain.   All other systems reviewed and are negative.      Objective   Objective     Vitals:   Temp:  [98 °F (36.7 °C)-98.6 °F (37 °C)] 98.2 °F (36.8 °C)  Heart Rate:  [] 101  Resp:  [16-18] 18  BP: ()/(50-83) 108/60    Physical Exam  Vitals and nursing note reviewed. Exam conducted with a chaperone present.   Constitutional:       Appearance: Normal appearance.   HENT:      Head: Normocephalic and atraumatic.   Abdominal:      General: Abdomen is flat. Bowel sounds are normal.      Palpations: Abdomen is soft.   Musculoskeletal:         General: Normal range of motion.      Cervical back: Normal range of motion and neck supple.   Skin:     General: Skin is warm and dry.   Neurological:      General: No focal deficit present.      Mental Status: She is alert and oriented to person, place, and time. Mental status is at baseline.   Psychiatric:         Mood and Affect: Mood normal.         Behavior: Behavior normal.         Thought Content: Thought content normal.         Judgment: Judgment normal.     Fundal height is appropriate and there are no palpable  contractions     Result Review    Result Review:  I have personally reviewed the results from the time of this admission to 2/2/2024 09:28 CST and agree with these findings:  []  Laboratory list / accordion  []  Microbiology  []  Radiology  []  EKG/Telemetry   []  Cardiology/Vascular   []  Pathology  []  Old records  []  Other:  Most notable findings include: No new labs      Assessment & Plan   Assessment / Plan     Brief Patient Summary:  Xiomara Holliday is a 23 y.o. female who is admitted for evaluation of possible seizures.  Management per primary team.  Given the presence of possible abdominal cramping, will place on tocometer.    Active Hospital Problems:  Active Hospital Problems    Diagnosis     **Seizure     Hypokalemia     Second trimester pregnancy     PTSD (post-traumatic stress disorder)      Plan:   Tocometer per labor and delivery staff  Further recommendations pending those results  Call for concerns or questions    DVT prophylaxis:  Mechanical DVT prophylaxis orders are present.        CODE STATUS:    Code Status (Patient has no pulse and is not breathing): CPR (Attempt to Resuscitate)  Medical Interventions (Patient has pulse or is breathing): Full Support    Disposition:  I expect patient to be discharged per primary team.    Navdeep Boone MD

## 2024-02-02 NOTE — PROGRESS NOTES
"NEUROLOGY FOLLOW-UP     DOS: 2024  NAME: Xiomara Holliday   : 2000  PCP: Serafin Green MD  CC: Stroke  Referring MD: No ref. provider found    Neurological Problem and Interval History:  23 y.o. female with psychiatric conditions, multiple psychosocial stressors, and pregnancy presents with chief complaint of spells of starring and confusion. She notes she is amnestic of some of he events and others she has awareness but reports she is told that she is confused during the sells. SHE HAS AHD SPELLS X1 WEEK. She has multiple psychosocial stressors.She reports several spells each day.    Medications On Admission  Medications Prior to Admission   Medication Sig Dispense Refill Last Dose    clonazePAM (KlonoPIN) 1 MG tablet Take 1 tablet by mouth 2 (Two) Times a Day As Needed for Anxiety.   Patient Taking Differently    CHOLINE PO Take 1 tablet by mouth Daily.       famotidine (PEPCID) 20 MG tablet Take 1 tablet by mouth 2 (Two) Times a Day As Needed for Heartburn or Indigestion.       Prenatal MV & Min w/FA-DHA (Prenatal Adult Gummy/DHA/FA) 0.4-25 MG chewable tablet Chew 1 each Daily.            Laboratory Results:   Lab Results   Component Value Date    GLUCOSE 66 2024    CALCIUM 8.1 (L) 2024     2024    K 4.0 2024    CO2 22.0 2024     2024    BUN 5 (L) 2024    CREATININE 0.37 (L) 2024    EGFRIFNONA 134 2021    BCR 13.5 2024    ANIONGAP 11.0 2024     Lab Results   Component Value Date    WBC 8.02 2024    HGB 9.6 (L) 2024    HCT 29.8 (L) 2024    MCV 89.8 2024     2024     No results found for: \"CHOL\"  Lab Results   Component Value Date    HDL 32 (L) 2022    HDL 48 (L) 10/05/2019     Lab Results   Component Value Date     (H) 2022     10/05/2019     Lab Results   Component Value Date    TRIG 61 2022    TRIG 42 10/05/2019     Lab Results   Component " "Value Date    HGBA1C 5.1 10/06/2019     Lab Results   Component Value Date    INR 0.97 02/01/2024    INR 0.92 09/19/2023    INR 0.96 09/21/2021    PROTIME 13.0 02/01/2024    PROTIME 12.4 09/19/2023    PROTIME 12.4 09/21/2021         Physical Examination:   /57 (BP Location: Left arm, Patient Position: Lying)   Pulse 94   Temp 98.2 °F (36.8 °C) (Oral)   Resp 18   Ht 160 cm (63\")   Wt 69.9 kg (154 lb)   LMP 06/15/2023 Comment: has endometrosis  SpO2 99%   BMI 27.28 kg/m²     Higher Integrative  Function:        Oriented to time, place and person.   CN II:   Pupils are equal, round, and reactive to light. Blinks to visual threat and counts fingers in all fields  CN III IV VI:     Extraocular movements are full without nystagmus.   CN V:  Normal facial sensation   CN VII:            Facial movements are symmetric. No labial dysarthria  CN VIII:                                   Auditory acuity is normal.  CN IX & X:                              No palatal or pharnygeal dysarthria.  CN XI: Turns head without abnormality.   CN XII:                                    The tongue is midline.  No lingual dysarthria.   Motor: Normal muscle strength, bulk and tone in upper and lower extremities.  No fasciculations, rigidity, spasticity, or abnormal movements.  Reflexes:        2+ in the upper and lower extremities. Plantar responses are flexor.  Sensation:      Normal to light touch thoughout  Station and Gait:       Deferred for bed rest    Coordination:            Finger-to-nose test shows no dysmetria.  Rapid alternating movements are normal.  Heel-to-shin normal.        Diagnoses / Discussion:  23 y.o. female with psychiatric conditions, multiple psychosocial stressors, and pregnancy presents with chief complaint of spells of starring and confusion.      Plan:  MRI/MRA/MRV w/o unremarkable  HCT: unremarkable  Continuous EEG for spell classification    -2/2: Patient is at her neurological baseline, she is not " "having her usual psychosocial stressors, reviewed description of events with family at bedside. They feels as though she looked \"glassy-eyed\" and tired. Patient was found to have anemia. The event description was looking glassy eyed and then feeling dizzy while standing then slid down a wall and similar. Patient had events from standing. No tongue bite, no incontinence, no post-ictal state. Events may be syncope/presyncope 2/2 changes in pregnancy and anemia. No spells on LTM.      I have discussed the above with the patient   Time spent with patient: 30min    MDM  Reviewed: previous chart, nursing note and vitals  Reviewed previous: labs, CT scan and MRI  Interpretation: labs, CT scan and MRI  Total time providing critical care: 30-74 minutes. This excludes time spent performing separately reportable procedures and services.  Consults: neurology         Dorothea Rodriguez MD   Neurology              "

## 2024-02-02 NOTE — CONSULTS
NEUROLOGY CONSULT     DOS: 2024  NAME: Xiomara Holliday   : 2000  PCP: Serafin Green MD  CC: Stroke  Referring MD: No ref. provider found      Neurological Problem and Interval History:  23 y.o. female with psychiatric conditions, multiple psychosocial stressors, and pregnancy presents with chief complaint of spells of starring and confusion. She notes she is amnestic of some of he events and others she has awareness but reports she is told that she is confused during the sells. SHE HAS AHD SPELLS X1 WEEK. She has multiple psychosocial stressors.She reports several spells each day.      Past Medical/Surgical Hx:  Past Medical History:   Diagnosis Date    ADHD (attention deficit hyperactivity disorder)     Allergic     Anxiety     Asthma     Bipolar 1 disorder     Bulimia nervosa     COVID     Depression     Endometriosis     Endometriosis     GERD (gastroesophageal reflux disease)     History of medical problems     Panic disorder     PCOS (polycystic ovarian syndrome)     PTSD (post-traumatic stress disorder)     Urinary tract infection      Past Surgical History:   Procedure Laterality Date    ENDOMETRIAL ABLATION      EXPLORATORY LAPAROTOMY         Review of Systems:       No fever, sweats or chills,  No neck pain, back pain or joint pain  No loss of hearing or tinnitus  No blurry or double vision  No rashes or edema  No chest pain or palpitations  No shortness of breath or wheezing  No diarrhea or constipation  No urinary incontinence or dysuria  No depression or anxiety    Medications On Admission  (Not in a hospital admission)      Allergies:  Allergies   Allergen Reactions    Bactrim [Sulfamethoxazole-Trimethoprim] Rash    Sulfa Antibiotics Rash       Social Hx:  Social History     Socioeconomic History    Marital status:    Tobacco Use    Smoking status: Light Smoker     Types: Electronic Cigarette    Smokeless tobacco: Never   Substance and Sexual Activity    Alcohol use:  "Not Currently     Comment: Rarely    Drug use: No    Sexual activity: Yes     Partners: Female     Birth control/protection: None       Family Hx:  Family History   Problem Relation Age of Onset    Suicidality Mother     No Known Problems Father        Review of Imaging (Interpretation of images not reports):  -HCT: unremarkable    Laboratory Results:   Lab Results   Component Value Date    GLUCOSE 75 02/01/2024    CALCIUM 7.9 (L) 02/01/2024     02/01/2024    K 3.0 (L) 02/01/2024    CO2 23.0 02/01/2024     02/01/2024    BUN 5 (L) 02/01/2024    CREATININE 0.32 (L) 02/01/2024    EGFRIFNONA 134 09/28/2021    BCR 15.6 02/01/2024    ANIONGAP 10.0 02/01/2024     Lab Results   Component Value Date    WBC 8.94 02/01/2024    HGB 9.9 (L) 02/01/2024    HCT 30.2 (L) 02/01/2024    MCV 88.8 02/01/2024     02/01/2024     No results found for: \"CHOL\"  Lab Results   Component Value Date    HDL 32 (L) 12/01/2022    HDL 48 (L) 10/05/2019     Lab Results   Component Value Date     (H) 12/01/2022     10/05/2019     Lab Results   Component Value Date    TRIG 61 12/01/2022    TRIG 42 10/05/2019     Lab Results   Component Value Date    HGBA1C 5.1 10/06/2019     Lab Results   Component Value Date    INR 0.97 02/01/2024    INR 0.92 09/19/2023    INR 0.96 09/21/2021    PROTIME 13.0 02/01/2024    PROTIME 12.4 09/19/2023    PROTIME 12.4 09/21/2021         Physical Examination:   /68   Pulse 94   Temp 98.5 °F (36.9 °C)   Resp 18   Ht 160 cm (63\")   Wt 74.2 kg (163 lb 8 oz)   LMP 06/15/2023 Comment: has endometrosis  SpO2 100%   BMI 28.96 kg/m²   Higher Integrative  Function: Oriented to time, place and person.   CN II: Pupils are equal, round, and reactive to light. Blinks to visual threat and counts fingers in all fields  CN III IV VI: Extraocular movements are full without nystagmus.   CN V: Normal facial sensation   CN VII: Facial movements are symmetric. No labial dysarthria  CN " VIII:   Auditory acuity is normal.  CN IX & X:   No palatal or pharnygeal dysarthria.  CN XI: Turns head without abnormality.   CN XII:   The tongue is midline.  No lingual dysarthria.   Motor: Normal muscle strength, bulk and tone in upper and lower extremities.  No fasciculations, rigidity, spasticity, or abnormal movements.  Reflexes: 2+ in the upper and lower extremities. Plantar responses are flexor.  Sensation: Normal to light touch thoughout  Station and Gait: Deferred for bed rest    Coordination: Finger-to-nose test shows no dysmetria.  Rapid alternating movements are normal.  Heel-to-shin normal.      Diagnoses / Discussion:  23 y.o. female with psychiatric conditions, multiple psychosocial stressors, and pregnancy presents with chief complaint of spells of starring and confusion.     Plan:  MRI/MRA/MRV w/o  HCT: unremarkable  Continuous EEG for spell classification     I have discussed the above with the patient and family.  Time spent with patient: 60min    MDM  Reviewed: previous chart, nursing note and vitals  Reviewed previous: labs and CT scan  Interpretation: labs and CT scan  Total time providing critical care: 30-74 minutes. This excludes time spent performing separately reportable procedures and services.  Consults: neurology         Dorothea Rodriguez MD   Neurology

## 2024-02-03 ENCOUNTER — READMISSION MANAGEMENT (OUTPATIENT)
Dept: CALL CENTER | Facility: HOSPITAL | Age: 24
End: 2024-02-03
Payer: COMMERCIAL

## 2024-02-03 VITALS
TEMPERATURE: 97.4 F | WEIGHT: 154 LBS | OXYGEN SATURATION: 98 % | RESPIRATION RATE: 18 BRPM | HEART RATE: 97 BPM | DIASTOLIC BLOOD PRESSURE: 60 MMHG | SYSTOLIC BLOOD PRESSURE: 113 MMHG | BODY MASS INDEX: 27.29 KG/M2 | HEIGHT: 63 IN

## 2024-02-03 PROCEDURE — 99213 OFFICE O/P EST LOW 20 MIN: CPT | Performed by: OBSTETRICS & GYNECOLOGY

## 2024-02-03 PROCEDURE — 99214 OFFICE O/P EST MOD 30 MIN: CPT | Performed by: PSYCHIATRY & NEUROLOGY

## 2024-02-03 PROCEDURE — G0378 HOSPITAL OBSERVATION PER HR: HCPCS

## 2024-02-03 RX ADMIN — OXYCODONE HYDROCHLORIDE AND ACETAMINOPHEN 500 MG: 500 TABLET ORAL at 08:24

## 2024-02-03 RX ADMIN — DOCUSATE SODIUM 100 MG: 100 CAPSULE, LIQUID FILLED ORAL at 08:24

## 2024-02-03 RX ADMIN — ASPIRIN 81 MG: 81 TABLET, COATED ORAL at 08:24

## 2024-02-03 RX ADMIN — PRENATAL VIT W/ FE FUMARATE-FA TAB 27-0.8 MG 1 TABLET: 27-0.8 TAB at 08:24

## 2024-02-03 RX ADMIN — FERROUS SULFATE TAB 325 MG (65 MG ELEMENTAL FE) 325 MG: 325 (65 FE) TAB at 08:24

## 2024-02-03 RX ADMIN — Medication 10 ML: at 10:20

## 2024-02-03 RX ADMIN — SUCRALFATE 1 G: 1 TABLET ORAL at 08:24

## 2024-02-03 NOTE — OUTREACH NOTE
Prep Survey      Flowsheet Row Responses   [facility] patient discharged from? [place]   Is LACE score < 7 ? Yes   Eligibility [facility]   Date of Admission 02/01/24   Date of Discharge 02/03/24   Discharge Disposition Home or Self Care   Discharge diagnosis Second trimester pregnancy       PTSD (post-traumatic stress disorder)   Does the patient have one of the following disease processes/diagnoses(primary or secondary)? Other   Does the patient have Home health ordered? No   Is there a DME ordered? No   Prep survey completed? Yes            Brenna BLANK - Registered Nurse

## 2024-02-03 NOTE — PROGRESS NOTES
NEUROLOGY FOLLOW-UP     DOS: 2/3/2024  NAME: Xiomara Holliday   : 2000  PCP: Serafin Green MD  CC: Stroke  Referring MD: No ref. provider found    Neurological Problem and Interval History:  23 y.o. female with psychiatric conditions, multiple psychosocial stressors, and pregnancy presents with chief complaint of spells of starring and confusion. She notes she is amnestic of some of he events and others she has awareness but reports she is told that she is confused during the sells. SHE HAS AHD SPELLS X1 WEEK. She has multiple psychosocial stressors.She reports several spells each day.     -2/3: Patient has not had any spells while admitted and has completed continuous EEG monitoring. Overall, it is favored that these spells were either syncope/presyncope or stress-induced spells. However, we did discuss that I cannot rule out seizure and given she reported multiple spells, I offered an AED and at this time she declines an AED. She is not a good candidate for keppra given h/o SI and psychiatric issues. If she has any further spells or changes her mind regarding an AED, Lamictal would be the best choice and this also offers mood stabilization. However, at this time, she declines any AED.    Medications On Admission  Medications Prior to Admission   Medication Sig Dispense Refill Last Dose    clonazePAM (KlonoPIN) 1 MG tablet Take 1 tablet by mouth 2 (Two) Times a Day As Needed for Anxiety.   Patient Taking Differently    CHOLINE PO Take 1 tablet by mouth Daily.       famotidine (PEPCID) 20 MG tablet Take 1 tablet by mouth 2 (Two) Times a Day As Needed for Heartburn or Indigestion.       Prenatal MV & Min w/FA-DHA (Prenatal Adult Gummy/DHA/FA) 0.4-25 MG chewable tablet Chew 1 each Daily.            Laboratory Results:   Lab Results   Component Value Date    GLUCOSE 66 2024    CALCIUM 8.1 (L) 2024     2024    K 4.0 2024    CO2 22.0 2024     2024     "BUN 5 (L) 02/02/2024    CREATININE 0.37 (L) 02/02/2024    EGFRIFNONA 134 09/28/2021    BCR 13.5 02/02/2024    ANIONGAP 11.0 02/02/2024     Lab Results   Component Value Date    WBC 8.02 02/02/2024    HGB 9.6 (L) 02/02/2024    HCT 29.8 (L) 02/02/2024    MCV 89.8 02/02/2024     02/02/2024     No results found for: \"CHOL\"  Lab Results   Component Value Date    HDL 32 (L) 12/01/2022    HDL 48 (L) 10/05/2019     Lab Results   Component Value Date     (H) 12/01/2022     10/05/2019     Lab Results   Component Value Date    TRIG 61 12/01/2022    TRIG 42 10/05/2019     Lab Results   Component Value Date    HGBA1C 5.1 10/06/2019     Lab Results   Component Value Date    INR 0.97 02/01/2024    INR 0.92 09/19/2023    INR 0.96 09/21/2021    PROTIME 13.0 02/01/2024    PROTIME 12.4 09/19/2023    PROTIME 12.4 09/21/2021         Physical Examination:   /60 (BP Location: Left arm, Patient Position: Lying)   Pulse 97   Temp 97.4 °F (36.3 °C) (Oral)   Resp 18   Ht 160 cm (63\")   Wt 69.9 kg (154 lb)   LMP 06/15/2023 Comment: has endometrosis  SpO2 98%   BMI 27.28 kg/m²     Higher Integrative  Function:        Oriented to time, place and person.   CN II:   Pupils are equal, round, and reactive to light. Blinks to visual threat and counts fingers in all fields  CN III IV VI:     Extraocular movements are full without nystagmus.   CN V:  Normal facial sensation   CN VII:            Facial movements are symmetric. No labial dysarthria  CN VIII:                                   Auditory acuity is normal.  CN IX & X:                              No palatal or pharnygeal dysarthria.  CN XI: Turns head without abnormality.   CN XII:                                    The tongue is midline.  No lingual dysarthria.   Motor: Normal muscle strength, bulk and tone in upper and lower extremities.  No fasciculations, rigidity, spasticity, or abnormal movements.  Reflexes:        2+ in the upper and lower " extremities. Plantar responses are flexor.  Sensation:      Normal to light touch thoughout  Station and Gait:       Deferred for bed rest    Coordination:            Finger-to-nose test shows no dysmetria.  Rapid alternating movements are normal.  Heel-to-shin normal.        Diagnoses / Discussion:  23 y.o. female with psychiatric conditions, multiple psychosocial stressors, and pregnancy presents with chief complaint of spells of starring and confusion.      Plan:  -MRI/MRA/MRV w/o unremarkable  -HCT: unremarkable  -Continuous EEG: no spells captured, unremarkable  -F/u in neurology clinic in 3-4 weeks  -At this time, neurology will sign-off. Please feel free to call us back if we can be of any further assistance.       -2/3: Patient has not had any spells while admitted and has completed continuous EEG monitoring. Overall, it is favored that these spells were either syncope/presyncope or stress-induced spells. However, we did discuss that I cannot rule out seizure and given she reported multiple spells, I offered an AED and at this time she declines an AED. She is not a good candidate for keppra given h/o SI and psychiatric issues. If she has any further spells or changes her mind regarding an AED, Lamictal would be the best choice and this also offers mood stabilization. However, at this time, she declines any AED.     I have discussed the above with the patient   Time spent with patient: 30min    MDM  Reviewed: previous chart, nursing note and vitals  Reviewed previous: labs, CT scan and MRI  Interpretation: labs, CT scan and MRI  Total time providing critical care: 30-74 minutes. This excludes time spent performing separately reportable procedures and services.  Consults: neurology         Dorothea Rodriguez MD   Neurology

## 2024-02-03 NOTE — PLAN OF CARE
Goal Outcome Evaluation:  Plan of Care Reviewed With: patient        Progress: no change  Outcome Evaluation: A&Ox4. No c/o pain. EEG continued. Reny FALLON. Appears to be sleeping between care.

## 2024-02-03 NOTE — DISCHARGE SUMMARY
[facility]  DISCHARGE SUMMARY       Date of Admission: 2/1/2024  Date of Discharge:  2/3/2024  Primary Care Physician: Serafin Green MD    Presenting Problem/History of Present Illness:  23 year old female with PMH of PTSD, panic attacks, 23 weeks pregnant that presents to the ER with complaints of daily episodes for the last 3 days where she would feel chest pressure, blurry vision and go into staring episodes for a few minutes, followed by disorientation and difficulty talking. She states this is new and different from her usual panic attacks.      Final Discharge Diagnoses:  Active Hospital Problems    Diagnosis     **Seizure     Hypokalemia     Second trimester pregnancy     PTSD (post-traumatic stress disorder)        Consults:   Dr TIERRA Rodriguez, neurology  Dr MELI Flores, OBGYN    Procedures Performed:   Continuous video EEG    Pertinent Test Results:       Imaging Results (All)       Procedure Component Value Units Date/Time    US Ob 14 + Weeks Single or First Gestation [[id number]] Collected: 02/02/24 1455     Updated: 02/02/24 1502    Narrative:      EXAMINATION: US OB 14 + WEEKS SINGLE OR FIRST GESTATION-     2/2/2024 11:50 AM     HISTORY: pregnancy, growth; R56.9-Unspecified convulsions;  Z34.90-Encounter for supervision of normal pregnancy, unspecified,  unspecified trimester     Grayscale, color-flow, and Doppler fetal ultrasound evaluation.     Single intrauterine fetus with documented heartbeat.     Fetal heart rate = 141-145 bpm.     The fetus shows cephalic positioning.     RIGHT lateral position of the placenta.     The single largest pocket of fluid measures 4.57 cm.     Gestational age = 23 weeks 1 day.  Ultrasound measurements correlate with an age of 23 weeks 6 days.     Estimated fetal weight = 709.5 g.       Impression:      1. Second trimester pregnancy.  2. Documented fetal heart beat and amniotic fluid.           This report was  signed and finalized on 2/2/2024 2:59 PM by Dr. Laureano Schwartz MD.       MRI Angiogram Venogram Head [[id number]] Collected: 02/01/24 2101     Updated: 02/01/24 2113    Narrative:      MRI ANGIOGRAM VENOGRAM HEAD- 2/1/2024 6:00 PM     HISTORY: Dural venous sinus thrombosis suspected; R56.9-Unspecified  convulsions; Z34.90-Encounter for supervision of normal pregnancy,  unspecified, unspecified trimester     TECHNIQUE:  Technique: 2-D time-of-flight images were obtained of the  intracranial venous sinuses. Reconstructed images were then obtained in  the coronal and sagittal projections.     Comparison: None.     FINDINGS: ?     There is normal flow signal within the superior sagittal sinus, left  transverse sinus, and bilateral sigmoid sinuses. The right transverse  sinus demonstrates absence of flow signal and appears to be hypoplastic  on sagittal T1-weighted images. The straight sinus, vein of Wilder, and  bilateral internal cerebral veins are patent. The inferior sagittal  sinus demonstrates normal flow signal. The superficial cerebral veins  appear to demonstrate normal flow signal.       Impression:         No convincing evidence of a dural venous sinus thrombosis.     Hypoplastic appearance of the right transverse sinus without flow  signal.     This report was signed and finalized on 2/1/2024 9:10 PM by Khai Alcala.       MRI Angiogram Neck Without Contrast [[id number]] Collected: 02/01/24 2057     Updated: 02/01/24 2104    Narrative:      MRI ANGIOGRAM NECK WO CONTRAST- 2/1/2024 5:49 PM     CLINICAL: Neuro deficit, acute, stroke suspected; R56.9-Unspecified  convulsions; Z34.90-Encounter for supervision of normal pregnancy,  unspecified, unspecified trimester     COMPARISON: NONE      Technique: 3D TOF SPGR images were obtained without contrast with 3-D  and MIP post processing and reconstruction.      Findings:   The visualized aortic arch and proximal great vessels are unremarkable.      The right common  carotid artery, right internal carotid artery, and  right external carotid artery are unremarkable. No aneurysm or  occlusion. No flow-limiting lesion.      The left common carotid artery, left internal carotid artery, and left  external carotid artery are unremarkable. No aneurysm or occlusion. No  flow-limiting lesion.        Impression:      Impression:   1. Unremarkable MRA of the neck.               This report was signed and finalized on 2/1/2024 9:01 PM by Khai Alcala.       MRI Angiogram Head Without Contrast [[id number]] Collected: 02/01/24 2008     Updated: 02/01/24 2014    Narrative:      MRI ANGIOGRAM HEAD WO CONTRAST- 2/1/2024 6:16 PM     HISTORY: Neuro deficit, acute, stroke suspected; R56.9-Unspecified  convulsions; Z34.90-Encounter for supervision of normal pregnancy,  unspecified, unspecified trimester     TECHNIQUE: Noncontrast 3-D time-of-flight imaging was performed through  the [employer] [place]. 3-D and maximum intensity projection (MIP) images  were created from the axial source data.      COMPARISON: None.     FINDINGS:      Distal internal carotid arteries are normal in caliber. Bilateral  anterior and middle cerebral arteries are patent and symmetric in  caliber. Intracranial vertebral arteries and basilar artery are normal  in caliber. The posterior cerebral arteries are patent and symmetric. No  visualized aneurysm or vascular malformation.       Impression:      Normal noncontrast MR angiogram of the brain.            This report was signed and finalized on 2/1/2024 8:11 PM by Khai Alcala.       MRI Brain Without Contrast [[id number]] Collected: 02/01/24 2003     Updated: 02/01/24 2011    Narrative:      Exam: MRI BRAIN WO CONTRAST- 2/1/2024 6:17 PM     History: Seizure disorder, clinical change; R56.9-Unspecified  convulsions; Z34.90-Encounter for supervision of normal pregnancy,  unspecified, unspecified trimester     Technique: Multisequence, multiplanar MRI of the brain was  performed  without intravenous contrast.      Contrast: None.     Comparison: None     Findings:      Ventricles and extra-axial CSF spaces are normal in size. Major vascular  flow voids are preserved.     No abnormal intracranial parenchymal FLAIR signal abnormality. No  abnormal susceptibility artifact. No abnormal diffusion restriction. No  evidence of mesial temporal sclerosis or cortical dysplasia. No visible  heterotopia.     Sellar/parasellar structures are grossly unremarkable. No tonsillar  ectopia.     Orbits are grossly unremarkable. Paranasal sinuses are grossly clear.  Mastoid air cells are grossly clear.     No suspicious calvarial or extracranial soft tissue abnormality.       Impression:      Impression:     No acute intracranial abnormality or intracranial mass lesion. No  evidence of mesotemporal sclerosis, cortical dysplasia, or heterotopia.        This report was signed and finalized on 2/1/2024 8:07 PM by Khai Alcala.       CT Head Without Contrast [[id number]] Collected: 02/01/24 1453     Updated: 02/01/24 1457    Narrative:      EXAMINATION: CT HEAD WO CONTRAST-      2/1/2024 1:38 PM     HISTORY: syncope / near syncope     In order to have a CT radiation dose as low as reasonably achievable  Automated Exposure Control was utilized for adjustment of the mA and/or  KV according to patient size.     CT Dose DLP = 642.7 mGy.cm.  (If there are multiple studies performed at the same time this  represents the total dose).     Comparison:  None.     Axial, sagittal, and coronal noncontrast CT imaging of the head.     The visualized paranasal sinuses are clear.     The brain and ventricles have an age appropriate appearance.      There is no hemorrhage or mass-effect.   No acute infarction is seen.     No calvarial abnormality.       Impression:      1. No acute intracranial abnormality is seen.           This report was signed and finalized on 2/1/2024 2:54 PM by Dr. Laureano Schwartz MD.          "    LAB RESULTS:      Lab 02/02/24  0329 02/01/24  1334   WBC 8.02 8.94   HEMOGLOBIN 9.6* 9.9*   HEMATOCRIT 29.8* 30.2*   PLATELETS 284 262   NEUTROS ABS 5.38 6.62   IMMATURE GRANS (ABS) 0.03 0.04   LYMPHS ABS 1.85 1.56   MONOS ABS 0.65 0.65   EOS ABS 0.09 0.06   MCV 89.8 88.8   PROTIME  --  13.0   APTT  --  25.3         Lab 02/02/24  0926 02/02/24  0329 02/01/24  1334   SODIUM  --  139 139   POTASSIUM 4.0 3.7 3.0*   CHLORIDE  --  106 106   CO2  --  22.0 23.0   ANION GAP  --  11.0 10.0   BUN  --  5* 5*   CREATININE  --  0.37* 0.32*   EGFR  --  145.5 150.7   GLUCOSE  --  66 75   CALCIUM  --  8.1* 7.9*   MAGNESIUM  --   --  2.0         Lab 02/01/24  1334   TOTAL PROTEIN 6.0   ALBUMIN 3.4*   GLOBULIN 2.6   ALT (SGPT) 10   AST (SGOT) 14   BILIRUBIN <0.2   ALK PHOS 57         Lab 02/01/24  1334   HSTROP T <6   PROTIME 13.0   INR 0.97                 Brief Urine Lab Results  (Last result in the past 365 days)        Color   Clarity   Blood   Leuk Est   Nitrite   Protein   CREAT   Urine HCG        02/01/24 1331 Yellow   Clear   Negative   Trace   Negative   Trace                 Microbiology Results (last 10 days)       ** No results found for the last 240 hours. **            Hospital Course:   Patient was admitted to neuro medical floor and observed for over 48 hours. Continuous video EEG was applied and no spells were detected, observed or reported. She is stable for discharge home.    Dr Rodriguez recommended: \"Patient has not had any spells while admitted and has completed continuous EEG monitoring. Overall, it is favored that these spells were either syncope/presyncope or stress-induced spells. However, we did discuss that I cannot rule out seizure and given she reported multiple spells, I offered an AED and at this time she declines an AED. She is not a good candidate for keppra given h/o SI and psychiatric issues. If she has any further spells or changes her mind regarding an AED, Lamictal would be the best choice and " "this also offers mood stabilization. However, at this time, she declines any AED. \"    Dr Flores recommended: \"advised against benzodiazepine use in pregnancy and consideration of a mood stabilizer instead, in congruency with Psychiatry's recommendations.  \"    Physical Exam on Discharge:  /60 (BP Location: Left arm, Patient Position: Lying)   Pulse 97   Temp 97.4 °F (36.3 °C) (Oral)   Resp 18   Ht 160 cm (63\")   Wt 69.9 kg (154 lb)   LMP 06/15/2023 Comment: has endometrosis  SpO2 98%   BMI 27.28 kg/m²   Physical Exam  Vitals reviewed.   Constitutional:       General: She is not in acute distress.     Appearance: She is well-developed. She is not toxic-appearing.   HENT:      Head: Normocephalic and atraumatic.      Right Ear: External ear normal.      Left Ear: External ear normal.      Mouth/Throat:      Mouth: Mucous membranes are dry.      Pharynx: Oropharynx is clear.   Eyes:      General:         Right eye: No discharge.         Left eye: No discharge.      Extraocular Movements: Extraocular movements intact.      Conjunctiva/sclera: Conjunctivae normal.      Pupils: Pupils are equal, round, and reactive to light.   Neck:      Vascular: No JVD.   Cardiovascular:      Rate and Rhythm: Normal rate and regular rhythm.      Pulses: Normal pulses.      Heart sounds: Normal heart sounds. No murmur heard.     No friction rub. No gallop.   Pulmonary:      Effort: Pulmonary effort is normal. No respiratory distress.      Breath sounds: No stridor. No wheezing, rhonchi or rales.   Chest:      Chest wall: No tenderness.   Abdominal:      General: Bowel sounds are normal. There is no distension.      Palpations: Abdomen is soft.      Tenderness: There is no abdominal tenderness. There is no guarding or rebound.      Hernia: No hernia is present.   Musculoskeletal:         General: No swelling, tenderness or deformity. Normal range of motion.      Cervical back: Normal range of motion and neck supple. No " rigidity or tenderness. No muscular tenderness.      Right lower leg: No edema.      Left lower leg: No edema.   Skin:     General: Skin is warm and dry.      Findings: No erythema or rash.   Neurological:      General: No focal deficit present.      Mental Status: She is alert and oriented to person, place, and time.      Cranial Nerves: No cranial nerve deficit.      Sensory: No sensory deficit.      Motor: No weakness or abnormal muscle tone.      Deep Tendon Reflexes: Reflexes normal.   Psychiatric:         Mood and Affect: Mood normal.         Behavior: Behavior normal.     Condition on Discharge:   Stable    Discharge Disposition:  Home     Discharge Medications:     Discharge Medications        Continue These Medications        Instructions Start Date   CHOLINE PO   1 tablet, Oral, Daily      famotidine 20 MG tablet  Commonly known as: PEPCID   20 mg, Oral, 2 Times Daily PRN             Stop These Medications      clonazePAM 1 MG tablet  Commonly known as: KlonoPIN            ASK your doctor about these medications        Instructions Start Date   Prenatal Adult Gummy/DHA/FA 0.4-25 MG chewable tablet   1 tablet, Oral, Daily           She needs to clarify medications with OBGYN for pregnancy safety.    Discharge Diet:   Regular diet    Activity at Discharge:   Resume usual activity    Follow-up Appointments:   No future appointments.    Test Results Pending at Discharge: None    Electronically signed by Nain Bull MD, 02/03/24, 16:33 CST.    Time: 32 minutes.

## 2024-02-03 NOTE — PROGRESS NOTES
Gregg Suazo MD  [facility]  [address]  [place] [address]  Office [phone]  Fax [phone]      [facility]  Xiomara Holliday  : 2000  MRN: [id number]  CSN: [id number]    Antepartum Progress Note    Subjective   Xiomara is 23 weeks 2 days gestational age today.  EEG monitors have just been removed this morning.  Today she reports feeling fetal movement, no vaginal bleeding, no leaking fluid, and no painful cramping.     Objective     Min/max vitals past 24 hours:   Temp  Min: 97.2 °F (36.2 °C)  Max: 98.2 °F (36.8 °C)  BP  Min: 89/46  Max: 109/66  Pulse  Min: 80  Max: 107  Resp  Min: 16  Max: 18         General: well developed; well nourished  no acute distress   Heart: regular rate and rhythm   Lungs: breathing is unlabored   Abdomen: soft, non-tender; no masses   FHT's: Present by Doppler   Cervix: Not checked   Contractions: none on tocometer yesterday   Extremities: no calf tenderness         I reviewed a pelvic ultrasound report from yesterday showing appropriate growth for gestational age, normal amniotic fluid volume, and normal-appearing placenta.       Assessment   IUP at 23w2d  Seizure  PTSD     Plan   She is stable from an obstetric point of view.  She reports having an upcoming appointment with her normal provider on .  She is hopeful to be discharged today, and her already scheduled appointment is appropriate for OB follow-up.  Okay for discharge from OB standpoint.    Gregg Suazo MD  2/3/2024  11:03 CST

## 2024-02-05 ENCOUNTER — TRANSITIONAL CARE MANAGEMENT TELEPHONE ENCOUNTER (OUTPATIENT)
Dept: CALL CENTER | Facility: HOSPITAL | Age: 24
End: 2024-02-05
Payer: COMMERCIAL

## 2024-02-05 NOTE — OUTREACH NOTE
Call Center TCM Note      Flowsheet Row Responses   [facility] patient discharged from? [place]   Does the patient have one of the following disease processes/diagnoses(primary or secondary)? Other   TCM attempt successful? No   Unsuccessful attempts Attempt 1  [No updated verbal release on file from PCP group]            Sandra Palacios RN    2/5/2024, 12:33 CST

## 2024-02-05 NOTE — OUTREACH NOTE
"Call Center TCM Note      Flowsheet Row Responses   [facility] patient discharged from? [place]   Does the patient have one of the following disease processes/diagnoses(primary or secondary)? Other   TCM attempt successful? Yes   Call start time 1358   Call end time 1400   Discharge diagnosis Second trimester pregnancy       PTSD (post-traumatic stress disorder)   Meds reviewed with patient/caregiver? Yes   Is the patient having any side effects they believe may be caused by any medication additions or changes? No   Does the patient have all medications ordered at discharge? N/A   Is the patient taking all medications as directed (includes completed medication regime)? Yes   Does the patient have an appointment with their PCP within 7-14 days of discharge? No   Nursing Interventions Routed TCM call to PCP office, PCP office requested to make appointment - message sent, Patient declined scheduling/rescheduling appointment at this time   Has home health visited the patient within 72 hours of discharge? N/A   Psychosocial issues? No   Did the patient receive a copy of their discharge instructions? Yes   Nursing interventions Reviewed instructions with patient   What is the patient's perception of their health status since discharge? Improving  [\"weak\" per pt]   Is the patient/caregiver able to teach back signs and symptoms related to disease process for when to call PCP? Yes   Is the patient/caregiver able to teach back signs and symptoms related to disease process for when to call 911? Yes   Is the patient/caregiver able to teach back the hierarchy of who to call/visit for symptoms/problems? PCP, Specialist, Home health nurse, Urgent Care, ED, 911 Yes   If the patient is a current smoker, are they able to teach back resources for cessation? --  [\"vaping every once in a while\" per pt -pt has tried various smoking cessation meds]   TCM call completed? Yes   Call end time 1400            Sandra Palacios " RN    2/5/2024, 14:01 CST

## 2024-02-09 LAB
QT INTERVAL: 368 MS
QTC INTERVAL: 452 MS

## 2024-03-07 ENCOUNTER — HOSPITAL ENCOUNTER (OUTPATIENT)
Facility: HOSPITAL | Age: 24
Discharge: HOME | End: 2024-03-07
Attending: OBSTETRICS & GYNECOLOGY | Admitting: OBSTETRICS & GYNECOLOGY
Payer: COMMERCIAL

## 2024-03-07 VITALS
SYSTOLIC BLOOD PRESSURE: 106 MMHG | DIASTOLIC BLOOD PRESSURE: 56 MMHG | HEART RATE: 89 BPM | TEMPERATURE: 97.2 F | RESPIRATION RATE: 18 BRPM

## 2024-03-07 PROBLEM — Z34.90 PREGNANCY: Status: ACTIVE | Noted: 2024-03-07

## 2024-03-07 PROCEDURE — 59025 FETAL NON-STRESS TEST: CPT

## 2024-03-07 PROCEDURE — G0378 HOSPITAL OBSERVATION PER HR: HCPCS

## 2024-03-07 PROCEDURE — G0463 HOSPITAL OUTPT CLINIC VISIT: HCPCS

## 2024-03-07 RX ORDER — FERROUS SULFATE 325(65) MG
325 TABLET ORAL
COMMUNITY

## 2024-03-07 NOTE — NON STRESS TEST
Xiomara Holliday, a  at 28w0d with an SANDRA of 2024, Date entered prior to episode creation, was seen at [facility] for a nonstress test.    Chief Complaint   Patient presents with    Fall     Fell on belly around 1215 today        Patient Active Problem List   Diagnosis    Rash    Tinea    Anxiety    Breast pain    PTSD (post-traumatic stress disorder)    Sore throat    Lymphadenopathy    Recurrent acute tonsillitis    Suicidal ideation    Tonsillitis and adenoiditis, chronic    Seizure    Hypokalemia    Second trimester pregnancy    Pregnancy       Start Time: 1300  Stop Time: 1320    Interpretation A  Nonstress Test Interpretation A: Reactive    JToombsRn/ASasseenRN

## 2024-03-07 NOTE — PROGRESS NOTES
Patient fell around 1215 and landed on belly and left wrist. Patient states she is not in any pain, abdomen is soft, no bleeding, and feeling baby move at this time.     JToombsRTONI

## 2024-06-01 ENCOUNTER — READMISSION MANAGEMENT (OUTPATIENT)
Dept: CALL CENTER | Facility: HOSPITAL | Age: 24
End: 2024-06-01
Payer: COMMERCIAL

## 2024-06-01 NOTE — OUTREACH NOTE
Prep Survey      Flowsheet Row Responses   [facility] facility patient discharged from? Non-[facility]   Is LACE score < 7 ? Non-BH Discharge   Eligibility Not Eligible   What are the reasons patient is not eligible? Other  [Admission for delivery of infant]   Does the patient have one of the following disease processes/diagnoses(primary or secondary)? Other   Prep survey completed? Yes            SIVAKUMAR YANEZ - Registered Nurse

## 2024-07-10 ENCOUNTER — OFFICE VISIT (OUTPATIENT)
Dept: FAMILY MEDICINE CLINIC | Facility: CLINIC | Age: 24
End: 2024-07-10
Payer: COMMERCIAL

## 2024-07-10 VITALS
DIASTOLIC BLOOD PRESSURE: 70 MMHG | HEART RATE: 90 BPM | SYSTOLIC BLOOD PRESSURE: 100 MMHG | HEIGHT: 63 IN | BODY MASS INDEX: 27.28 KG/M2 | OXYGEN SATURATION: 99 % | TEMPERATURE: 97.7 F

## 2024-07-10 DIAGNOSIS — G89.29 CHRONIC BILATERAL LOW BACK PAIN WITHOUT SCIATICA: Chronic | ICD-10-CM

## 2024-07-10 DIAGNOSIS — R10.11 RUQ PAIN: ICD-10-CM

## 2024-07-10 DIAGNOSIS — R26.81 GAIT INSTABILITY: Primary | ICD-10-CM

## 2024-07-10 DIAGNOSIS — M54.50 CHRONIC BILATERAL LOW BACK PAIN WITHOUT SCIATICA: Chronic | ICD-10-CM

## 2024-07-10 PROCEDURE — 99213 OFFICE O/P EST LOW 20 MIN: CPT | Performed by: NURSE PRACTITIONER

## 2024-07-10 PROCEDURE — 1126F AMNT PAIN NOTED NONE PRSNT: CPT | Performed by: NURSE PRACTITIONER

## 2024-07-10 RX ORDER — CLONAZEPAM 1 MG/1
1 TABLET ORAL AS NEEDED
COMMUNITY
Start: 2024-06-25

## 2024-07-10 RX ORDER — OMEPRAZOLE 20 MG/1
1 CAPSULE, DELAYED RELEASE ORAL DAILY
COMMUNITY
End: 2024-07-10 | Stop reason: SDUPTHER

## 2024-07-10 RX ORDER — OMEPRAZOLE 20 MG/1
20 CAPSULE, DELAYED RELEASE ORAL DAILY
Qty: 30 CAPSULE | Refills: 5 | Status: SHIPPED | OUTPATIENT
Start: 2024-07-10

## 2024-07-10 NOTE — PROGRESS NOTES
Subjective   Chief Complaint:  Gait instability, intermittent right upper quadrant pain    History of Present Illness:  This 23 y.o. female was seen in the office today.  She had a gallbladder workup prior-did not get to the HIDA scan step.  Has pain intermittently.  She is currently postpartum-reports continued low back pain-reports as of recently having intermittent saddle numbness and her legs will give way.    Past Medical, Surgical, Social, and Family History:  Allergies   Allergen Reactions    Bactrim [Sulfamethoxazole-Trimethoprim] Rash    Sulfa Antibiotics Rash      Current Outpatient Medications on File Prior to Visit   Medication Sig    clonazePAM (KlonoPIN) 1 MG tablet Take 1 tablet by mouth As Needed.    [DISCONTINUED] omeprazole (priLOSEC) 20 MG capsule Take 1 capsule by mouth Daily.    [DISCONTINUED] CHOLINE PO Take 1 tablet by mouth Daily. (Patient not taking: Reported on 7/10/2024)    [DISCONTINUED] famotidine (PEPCID) 20 MG tablet Take 1 tablet by mouth 2 (Two) Times a Day As Needed for Heartburn or Indigestion. (Patient not taking: Reported on 7/10/2024)    [DISCONTINUED] ferrous sulfate 325 (65 FE) MG tablet Take 1 tablet by mouth Daily With Breakfast. (Patient not taking: Reported on 7/10/2024)    [DISCONTINUED] Prenatal MV & Min w/FA-DHA (Prenatal Adult Gummy/DHA/FA) 0.4-25 MG chewable tablet Chew 1 each Daily. (Patient not taking: Reported on 7/10/2024)     No current facility-administered medications on file prior to visit.      Past Medical History:   Diagnosis Date    ADHD (attention deficit hyperactivity disorder)     Allergic     Anxiety     Asthma     Bipolar 1 disorder     Bulimia nervosa     COVID     Depression     Endometriosis     Endometriosis     GERD (gastroesophageal reflux disease)     History of medical problems     Low back pain     Panic anxiety syndrome     Panic disorder     PCOS (polycystic ovarian syndrome)     PTSD (post-traumatic stress disorder)     Seizure     Urinary  "tract infection       Past Surgical History:   Procedure Laterality Date     SECTION      ENDOMETRIAL ABLATION      EXPLORATORY LAPAROTOMY        Social History     Socioeconomic History    Marital status: Single   Tobacco Use    Smoking status: Light Smoker     Types: Electronic Cigarette    Smokeless tobacco: Never   Vaping Use    Vaping status: Some Days    Substances: THC    Devices: Pre-filled or refillable cartridge   Substance and Sexual Activity    Alcohol use: Not Currently     Comment: Rarely    Drug use: Yes     Types: Marijuana    Sexual activity: Not Currently     Partners: Female     Birth control/protection: Nexplanon      Family History   Problem Relation Age of Onset    Suicidality Mother     No Known Problems Father        Objective   Vital Signs  /70 (BP Location: Left arm, Patient Position: Sitting, Cuff Size: Large Adult)   Pulse 90   Temp 97.7 °F (36.5 °C) (Infrared)   Ht 160 cm (63\")   LMP 06/15/2023 Comment: has endometrosis  SpO2 99%   Breastfeeding Yes   BMI 27.28 kg/m²    Physical Exam  Vitals reviewed.   Constitutional:       General: She is not in acute distress.     Appearance: Normal appearance.   Cardiovascular:      Rate and Rhythm: Normal rate and regular rhythm.   Pulmonary:      Effort: Pulmonary effort is normal. No respiratory distress.      Breath sounds: Normal breath sounds.   Musculoskeletal:         General: Tenderness (*Lumbar levels) present.   Neurological:      Mental Status: She is alert.       Assessment & Plan   Diagnoses and all orders for this visit:    1. Gait instability (Primary)  Comments:  Acute symptom of chronic back pain  Orders:  -     MRI Lumbar Spine Without Contrast; Future    2. Chronic bilateral low back pain without sciatica  Comments:  Chronic, paired with gait instability diagnosis-MRI follow-advise neurodeficits-to ER  Orders:  -     MRI Lumbar Spine Without Contrast; Future    3. RUQ pain  Comments:  Acute, " noncomplicated  Orders:  -     US Gallbladder; Future    Other orders  -     omeprazole (priLOSEC) 20 MG capsule; Take 1 capsule by mouth Daily.  Dispense: 30 capsule; Refill: 5    Plan:  Advised and educated plan of care.    Advised if continues to have persistent saddle numbness or any loss of function-to ER otherwise we need to proceed with an MRI here.  Advise with the gallbladder concern-we need to restart the process with the ultrasound due to the length of time.    Follow-up:  The patient will Return for follow-up as needed pending results - we will call.    BMI is >= 25 and <30. (Overweight) The following options were offered after discussion;: Information on healthy weight added to patient's after visit summary.      Records and Results Review:  I performed a routine review of patient's chart including medication list and allergy list.    Electronically signed by JOZEF Smith, 07/10/24, 12:38 PM CDT.

## 2024-07-22 ENCOUNTER — HOSPITAL ENCOUNTER (OUTPATIENT)
Dept: ULTRASOUND IMAGING | Facility: HOSPITAL | Age: 24
Discharge: HOME | End: 2024-07-22
Admitting: NURSE PRACTITIONER
Payer: COMMERCIAL

## 2024-07-22 DIAGNOSIS — R10.11 RUQ PAIN: ICD-10-CM

## 2024-07-22 PROCEDURE — 76705 ECHO EXAM OF ABDOMEN: CPT

## 2024-07-22 NOTE — PROGRESS NOTES
Reviewed results - [employer] message sent.  If not seen in 3 days (3 day alert set), will send to pool to call the message.      Electronically signed by JOZEF Smith, 07/22/24, 7:54 AM CDT.

## 2024-08-07 ENCOUNTER — HOSPITAL ENCOUNTER (OUTPATIENT)
Dept: MRI IMAGING | Facility: HOSPITAL | Age: 24
Discharge: HOME | End: 2024-08-07
Admitting: NURSE PRACTITIONER
Payer: COMMERCIAL

## 2024-08-07 DIAGNOSIS — R26.81 GAIT INSTABILITY: ICD-10-CM

## 2024-08-07 DIAGNOSIS — M54.50 CHRONIC BILATERAL LOW BACK PAIN WITHOUT SCIATICA: Chronic | ICD-10-CM

## 2024-08-07 DIAGNOSIS — G89.29 CHRONIC BILATERAL LOW BACK PAIN WITHOUT SCIATICA: Chronic | ICD-10-CM

## 2024-08-07 PROCEDURE — 72148 MRI LUMBAR SPINE W/O DYE: CPT

## 2024-08-07 NOTE — PROGRESS NOTES
Reviewed results - [employer] message sent.  If not seen in 3 days (3 day alert set), will send to pool to call the message.      Electronically signed by JOZEF Smith, 08/07/24, 12:00 PM CDT.

## 2024-08-14 ENCOUNTER — TELEPHONE (OUTPATIENT)
Dept: FAMILY MEDICINE CLINIC | Facility: CLINIC | Age: 24
End: 2024-08-14

## 2024-08-14 NOTE — TELEPHONE ENCOUNTER
Caller: ABHISHEK    Relationship: Other NEUROSURGERY IN [place]    Best call back number:     [phone], [phone]     What is the best time to reach you:  ABHISHEK WILL BE IN A MEETING FOR THE NEXT HOUR.    Who are you requesting to speak with (clinical staff, provider,  specific staff member): JOZEF BATRES    What was the call regarding:  ABHISHEK STATES THEY ARE NOT ABLE TO SEE THE PATIENT  FOR THE URGENT REFERRAL DUE TO THE PATIENT'S INSURANCE. ABHISHEK STATES THAT SHE IS SENDING A FAX TO THE OFFICE AS WELL.

## 2024-08-14 NOTE — TELEPHONE ENCOUNTER
Can we send referral to somewhere that takes patient's insurance?    Electronically signed by JOZEF Smith, 08/14/24, 10:43 AM CDT.

## 2024-08-20 ENCOUNTER — TELEPHONE (OUTPATIENT)
Dept: FAMILY MEDICINE CLINIC | Facility: CLINIC | Age: 24
End: 2024-08-20

## 2024-10-25 ENCOUNTER — TELEPHONE (OUTPATIENT)
Dept: FAMILY MEDICINE CLINIC | Facility: CLINIC | Age: 24
End: 2024-10-25

## 2024-10-25 NOTE — TELEPHONE ENCOUNTER
Caller: Xiomara Holliday    Relationship: Self    Best call back number:  [phone]     What is the medical concern/diagnosis: SEE BELOW    What specialty or service is being requested: GI, STATUS ON UPDATE FOR GASTRO

## 2024-11-04 ENCOUNTER — HOSPITAL ENCOUNTER (EMERGENCY)
Facility: HOSPITAL | Age: 24
Discharge: HOME | End: 2024-11-04
Admitting: EMERGENCY MEDICINE
Payer: COMMERCIAL

## 2024-11-04 VITALS
OXYGEN SATURATION: 98 % | BODY MASS INDEX: 25.49 KG/M2 | WEIGHT: 138.5 LBS | RESPIRATION RATE: 16 BRPM | SYSTOLIC BLOOD PRESSURE: 124 MMHG | HEART RATE: 114 BPM | TEMPERATURE: 98.3 F | DIASTOLIC BLOOD PRESSURE: 74 MMHG | HEIGHT: 62 IN

## 2024-11-04 DIAGNOSIS — S00.96XA INSECT BITE OF HEAD, UNSPECIFIED PART, INITIAL ENCOUNTER: Primary | ICD-10-CM
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
## 2024-11-04 PROCEDURE — 99282 EMERGENCY DEPT VISIT SF MDM: CPT

## 2024-11-04 RX ORDER — CEPHALEXIN 500 MG/1
500 CAPSULE ORAL 2 TIMES DAILY
Qty: 10 CAPSULE | Refills: 0 | Status: SHIPPED | OUTPATIENT
Start: 2024-11-04 | End: 2024-11-09

## 2024-11-05 NOTE — ED PROVIDER NOTES
Subjective   History of Present Illness  Patient is a 24-year-old female who presents emergency department with complaints of an insect bite.  Reports that she noticed it on 2024.  It is under her left eye.  Patient reports that this morning she woke up and felt like her whole body was stiff.  Reports that she felt like she had COVID.  Tested negative at home.  Patient denies any definite fevers, but reports chills.  She is afebrile here.  She is very well-appearing on exam.  She does have what appears to look like an insect bite under her left eye that is mildly erythematous.  No evidence of necrosis.  No open wounds or drainage.  No streaking present.        Review of Systems   All other systems reviewed and are negative.      Past Medical History:   Diagnosis Date    ADHD (attention deficit hyperactivity disorder)     Allergic     Anxiety     Asthma     Bipolar 1 disorder     Bulimia nervosa     COVID     Depression     Endometriosis     Endometriosis     GERD (gastroesophageal reflux disease)     History of medical problems     Low back pain     Panic anxiety syndrome     Panic disorder     PCOS (polycystic ovarian syndrome)     PTSD (post-traumatic stress disorder)     Seizure     Urinary tract infection        Allergies   Allergen Reactions    Bactrim [Sulfamethoxazole-Trimethoprim] Rash    Sulfa Antibiotics Rash       Past Surgical History:   Procedure Laterality Date     SECTION      ENDOMETRIAL ABLATION      EXPLORATORY LAPAROTOMY         Family History   Problem Relation Age of Onset    Suicidality Mother     No Known Problems Father        Social History     Socioeconomic History    Marital status: Single   Tobacco Use    Smoking status: Light Smoker     Types: Electronic Cigarette    Smokeless tobacco: Never   Vaping Use    Vaping status: Some Days    Substances: THC    Devices: Pre-filled or refillable cartridge   Substance and Sexual Activity    Alcohol use: Not Currently     Comment:  Rarely    Drug use: Yes     Types: Marijuana    Sexual activity: Not Currently     Partners: Female     Birth control/protection: Nexplanon           Objective   Physical Exam  Vitals and nursing note reviewed.   Constitutional:       General: She is not in acute distress.     Appearance: Normal appearance. She is normal weight. She is not ill-appearing or toxic-appearing.   HENT:      Head: Normocephalic.   Cardiovascular:      Rate and Rhythm: Normal rate and regular rhythm.      Pulses: Normal pulses.      Heart sounds: Normal heart sounds.   Pulmonary:      Effort: Pulmonary effort is normal.      Breath sounds: Normal breath sounds.   Abdominal:      General: Abdomen is flat. Bowel sounds are normal. There is no distension.      Palpations: Abdomen is soft.      Tenderness: There is no abdominal tenderness.   Musculoskeletal:         General: Normal range of motion.      Cervical back: Normal range of motion and neck supple.   Skin:     General: Skin is warm and dry.      Comments: Patient with insect bite present under left eye.  Mildly erythematous.  No necrosis noted.  No streaking.  No open areas or drainage.  No fluctuation present.   Neurological:      General: No focal deficit present.      Mental Status: She is alert and oriented to person, place, and time. Mental status is at baseline.   Psychiatric:         Mood and Affect: Mood normal.         Behavior: Behavior normal.         Thought Content: Thought content normal.         Judgment: Judgment normal.         Procedures           ED Course                                               Medical Decision Making  Patient is a 24-year-old female who presents emergency department with complaints of an insect bite.  Reports that she noticed it on 11/2/2024.  It is under her left eye.  Patient reports that this morning she woke up and felt like her whole body was stiff.  Reports that she felt like she had COVID.  Tested negative at home.  Patient denies any  definite fevers, but reports chills.  She is afebrile here.  She is very well-appearing on exam.  She does have what appears to look like an insect bite under her left eye that is mildly erythematous.  No evidence of necrosis.  No open wounds or drainage.  No streaking present.    Vital signs stable.  Differential diagnoses include insect bite, viral illness, other.  A short course of Keflex has been prescribed.  Patient nontoxic-appearing.  Afebrile in the ER.  Did not feel that lab work was necessary at this time.  No evidence of cellulitis on exam.  Patient also likely with a viral illness as she said that she felt the same when she had COVID.  Reports that she is going to test again in a couple of days.  Patient advised to follow with PCP and return to the ER for any new or worsening symptoms.  Verbalized understanding of discharge instructions.  Patient will be discharged shortly in stable condition.    Problems Addressed:  Insect bite of head, unspecified part, initial encounter: acute illness or injury    Risk  Prescription drug management.        Final diagnoses:   Insect bite of head, unspecified part, initial encounter       ED Disposition  ED Disposition       ED Disposition   Discharge    Condition   Stable    Comment   --               Norberto Apodaca, APRN  [address] [place] [id number]  [phone]    Schedule an appointment as soon as possible for a visit in 1 day      [facility]  [address] [place] [id number] [phone]  Go to   If symptoms worsen         Medication List        New Prescriptions      cephalexin 500 MG capsule  Commonly known as: KEFLEX  Take 1 capsule by mouth 2 (Two) Times a Day for 5 days.               Where to Get Your Medications        These medications were sent to [facility] - [place], [place] - [address] [place] [address] - [phone]  - [phone]   [address] [place] [id number]      Phone: [phone]    cephalexin 500 MG capsule            Smitha Hardy, APRN  11/04/24 6725

## 2024-11-05 NOTE — DISCHARGE INSTRUCTIONS
Oral antibiotics been prescribed.  Follow-up PCP to reassess symptoms.  Return to the ER for any new or worsening symptoms.

## 2025-01-03 ENCOUNTER — OFFICE VISIT (OUTPATIENT)
Dept: FAMILY MEDICINE CLINIC | Facility: CLINIC | Age: 25
End: 2025-01-03
Payer: COMMERCIAL

## 2025-01-03 VITALS
DIASTOLIC BLOOD PRESSURE: 72 MMHG | WEIGHT: 135 LBS | SYSTOLIC BLOOD PRESSURE: 105 MMHG | HEART RATE: 84 BPM | HEIGHT: 62 IN | OXYGEN SATURATION: 99 % | BODY MASS INDEX: 24.84 KG/M2

## 2025-01-03 DIAGNOSIS — J02.9 SORE THROAT: Primary | ICD-10-CM

## 2025-01-03 DIAGNOSIS — J01.00 ACUTE MAXILLARY SINUSITIS, RECURRENCE NOT SPECIFIED: ICD-10-CM

## 2025-01-03 LAB
EXPIRATION DATE: NORMAL
INTERNAL CONTROL: NORMAL
Lab: NORMAL
S PYO AG THROAT QL: NEGATIVE

## 2025-01-03 PROCEDURE — 99213 OFFICE O/P EST LOW 20 MIN: CPT | Performed by: NURSE PRACTITIONER

## 2025-01-03 PROCEDURE — 1126F AMNT PAIN NOTED NONE PRSNT: CPT | Performed by: NURSE PRACTITIONER

## 2025-01-03 PROCEDURE — 87880 STREP A ASSAY W/OPTIC: CPT | Performed by: NURSE PRACTITIONER

## 2025-01-03 NOTE — PROGRESS NOTES
"Chief Complaint  ER Follow up     Subjective      Xiomara Holliday presents to [facility]  HPI: Patient is a 24 y.o. female who is here today for ER follow up. States 2 weeks ago she had cough, nasal congestion, sneezing. Last Saturday her eyes started feeling funny and started getting red. She woke up Sunday morning with them swollen shut and with green discharge. She was prescribed eye drops which helped her eyes. Her vision is still a little off, but she still feels bad. Complains of swollen lymph nodes, sore throat and sinus pain.    Objective   Vital Signs:  /72 (BP Location: Right arm, Patient Position: Sitting, Cuff Size: Adult)   Pulse 84   Ht 157.5 cm (62.01\")   Wt 61.2 kg (135 lb)   SpO2 99%   BMI 24.69 kg/m²   Estimated body mass index is 24.69 kg/m² as calculated from the following:    Height as of this encounter: 157.5 cm (62.01\").    Weight as of this encounter: 61.2 kg (135 lb).    BMI is within normal parameters. No other follow-up for BMI required.      Physical Exam  Constitutional:       Appearance: Normal appearance.   HENT:      Nose:      Right Sinus: Maxillary sinus tenderness present.      Left Sinus: Maxillary sinus tenderness (worse) present.      Mouth/Throat:      Pharynx: Pharyngeal swelling and posterior oropharyngeal erythema (mild) present.   Cardiovascular:      Rate and Rhythm: Normal rate and regular rhythm.   Pulmonary:      Effort: Pulmonary effort is normal.      Breath sounds: Normal breath sounds.   Lymphadenopathy:      Cervical:      Right cervical: Superficial cervical adenopathy (anterior and posterior, fluctuant/mobile, tender to touch) present.      Left cervical: Superficial cervical adenopathy (anterior and posterior, fluctuant/mobile, tender to touch) present.   Neurological:      Mental Status: She is alert.   Psychiatric:         Mood and Affect: Mood normal.        Result Review :  The following labs/imaging/notes " were reviewed and discussed with the patient by JOZEF Arita on 01/03/2025:             Assessment and Plan   Diagnoses and all orders for this visit:    1. Sore throat (Primary)  -     POC Rapid Strep A  -     amoxicillin-clavulanate (AUGMENTIN) 875-125 MG per tablet; Take 1 tablet by mouth 2 (Two) Times a Day for 10 days.  Dispense: 20 tablet; Refill: 0    2. Acute maxillary sinusitis, recurrence not specified  -     amoxicillin-clavulanate (AUGMENTIN) 875-125 MG per tablet; Take 1 tablet by mouth 2 (Two) Times a Day for 10 days.  Dispense: 20 tablet; Refill: 0      Due to longevity of symptoms, physical exam findings I have sent in Augmentin. Use as directed until completed. If symptoms persist or worsen, notify clinic.          Follow Up  No follow-ups on file.  Patient was given instructions and counseling regarding her condition or for health maintenance advice. Please see specific information pulled into the AVS if appropriate.

## 2025-04-30 ENCOUNTER — OFFICE VISIT (OUTPATIENT)
Dept: FAMILY MEDICINE CLINIC | Facility: CLINIC | Age: 25
End: 2025-04-30
Payer: COMMERCIAL

## 2025-04-30 VITALS
TEMPERATURE: 96.9 F | HEART RATE: 76 BPM | BODY MASS INDEX: 24.55 KG/M2 | OXYGEN SATURATION: 99 % | SYSTOLIC BLOOD PRESSURE: 100 MMHG | WEIGHT: 133.4 LBS | DIASTOLIC BLOOD PRESSURE: 50 MMHG | HEIGHT: 62 IN

## 2025-04-30 DIAGNOSIS — Z00.00 WELLNESS EXAMINATION: Primary | ICD-10-CM

## 2025-04-30 NOTE — PROGRESS NOTES
Subjective   Chief Complaint:  Patient is here for a physical examination    History of Present Illness:  This 24 y.o. female was seen in the office today for a physical examination.  She advises having some easy bruising but otherwise doing very well.  She is willing to get lab done and a checkup before she has not EGD on Friday in 2 days.    Review of Systems   Constitutional:  Negative for chills and fever.   HENT:  Negative for congestion, sinus pressure and sore throat.    Eyes:  Negative for blurred vision, pain and redness.   Respiratory:  Negative for cough, chest tightness, shortness of breath and wheezing.    Cardiovascular:  Negative for chest pain and palpitations.   Gastrointestinal:  Negative for abdominal distention and abdominal pain.   Endocrine: Negative for cold intolerance and heat intolerance.   Genitourinary:  Negative for dysuria, flank pain, hematuria and urgency.   Musculoskeletal:  Negative for arthralgias and myalgias.   Skin:  Negative for rash, skin lesions and wound.   Allergic/Immunologic: Negative for environmental allergies and food allergies.   Neurological:  Negative for dizziness, speech difficulty and numbness.   Hematological:  Negative for adenopathy. Bruises/bleeds easily.   Psychiatric/Behavioral:  Negative for behavioral problems and stress. The patient is not nervous/anxious.        Past Medical, Surgical, Social, and Family History:  Allergies   Allergen Reactions    Bactrim [Sulfamethoxazole-Trimethoprim] Rash    Sulfa Antibiotics Rash      Current Outpatient Medications on File Prior to Visit   Medication Sig    [DISCONTINUED] clonazePAM (KlonoPIN) 1 MG tablet Take 1 tablet by mouth As Needed. (Patient not taking: Reported on 4/30/2025)    [DISCONTINUED] omeprazole (priLOSEC) 20 MG capsule Take 1 capsule by mouth Daily. (Patient not taking: Reported on 4/30/2025)     No current facility-administered medications on file prior to visit.      Past Medical History:  "  Diagnosis Date    ADHD (attention deficit hyperactivity disorder)     Allergic     Anxiety     Asthma     Bipolar 1 disorder     Bulimia nervosa     Cholelithiasis     COVID     Dental disease     Depression     Endometriosis     Endometriosis     GERD (gastroesophageal reflux disease)     History of medical problems     Low back pain     Panic anxiety syndrome     Panic disorder     PCOS (polycystic ovarian syndrome)     PTSD (post-traumatic stress disorder)     Seizure     Tinnitus     Urinary tract infection       Past Surgical History:   Procedure Laterality Date     SECTION      ENDOMETRIAL ABLATION      EXPLORATORY LAPAROTOMY        Social History     Socioeconomic History    Marital status: Single   Tobacco Use    Smoking status: Light Smoker     Types: Electronic Cigarette     Passive exposure: Current    Smokeless tobacco: Never   Vaping Use    Vaping status: Some Days    Substances: THC    Devices: Pre-filled or refillable cartridge   Substance and Sexual Activity    Alcohol use: Not Currently     Comment: Rarely    Drug use: Yes     Types: Marijuana    Sexual activity: Yes     Partners: Female     Birth control/protection: Nexplanon      Family History   Problem Relation Age of Onset    Suicidality Mother     No Known Problems Father        Objective   Vital Signs  /50   Pulse 76   Temp 96.9 °F (36.1 °C) (Infrared)   Ht 157.5 cm (62.01\")   Wt 60.5 kg (133 lb 6.4 oz)   SpO2 99%   Breastfeeding Yes   BMI 24.39 kg/m²      Physical Exam  Vitals reviewed.   Constitutional:       General: She is not in acute distress.     Appearance: Normal appearance.   Neck:      Vascular: No carotid bruit.   Cardiovascular:      Rate and Rhythm: Normal rate and regular rhythm.      Pulses:           Dorsalis pedis pulses are 2+ on the right side and 2+ on the left side.        Posterior tibial pulses are 2+ on the right side and 2+ on the left side.   Pulmonary:      Effort: Pulmonary effort is normal. "      Breath sounds: Normal breath sounds.   Musculoskeletal:      Right lower leg: No edema.      Left lower leg: No edema.       Assessment & Plan   Diagnoses and all orders for this visit:    1. Wellness examination (Primary)  -     CBC & Differential  -     Comprehensive Metabolic Panel  -     TSH  -     Lipid Panel  -     Protime-INR; Future  -     APTT; Future    Plan:  Advised and educated plan of care.    Lab work is needed for this physical exam encounter - see orders.    Follow-up:  The patient will No follow-ups on file.      Anticipatory Guidance:  I advised the following anticipatory guidance:  Advised labs and reasoning-Teaching sheet sent to [facility] healthy eating and lifestyle-exercising to stay healthy article sent.    Weight Management:  BMI is within normal parameters. No other follow-up for BMI required.      Electronically signed by JZOEF Smith, 04/30/25, 11:27 AM CDT.

## 2025-05-01 LAB
ALBUMIN SERPL-MCNC: 4.2 G/DL (ref 3.5–5.2)
ALBUMIN/GLOB SERPL: 1.7 G/DL
ALP SERPL-CCNC: 66 U/L (ref 39–117)
ALT SERPL-CCNC: 13 U/L (ref 1–33)
AST SERPL-CCNC: 19 U/L (ref 1–32)
BASOPHILS # BLD AUTO: 0.02 10*3/MM3 (ref 0–0.2)
BASOPHILS NFR BLD AUTO: 0.4 % (ref 0–1.5)
BILIRUB SERPL-MCNC: <0.2 MG/DL (ref 0–1.2)
BUN SERPL-MCNC: 14 MG/DL (ref 6–20)
BUN/CREAT SERPL: 18.4 (ref 7–25)
CALCIUM SERPL-MCNC: 9.1 MG/DL (ref 8.6–10.5)
CHLORIDE SERPL-SCNC: 105 MMOL/L (ref 98–107)
CHOLEST SERPL-MCNC: 155 MG/DL (ref 0–200)
CO2 SERPL-SCNC: 26 MMOL/L (ref 22–29)
CREAT SERPL-MCNC: 0.76 MG/DL (ref 0.57–1)
EGFRCR SERPLBLD CKD-EPI 2021: 112.4 ML/MIN/1.73
EOSINOPHIL # BLD AUTO: 0.08 10*3/MM3 (ref 0–0.4)
EOSINOPHIL NFR BLD AUTO: 1.4 % (ref 0.3–6.2)
ERYTHROCYTE [DISTWIDTH] IN BLOOD BY AUTOMATED COUNT: 13.6 % (ref 12.3–15.4)
GLOBULIN SER CALC-MCNC: 2.5 GM/DL
GLUCOSE SERPL-MCNC: 54 MG/DL (ref 65–99)
HCT VFR BLD AUTO: 39.5 % (ref 34–46.6)
HDLC SERPL-MCNC: 42 MG/DL (ref 40–60)
HGB BLD-MCNC: 12.9 G/DL (ref 12–15.9)
IMM GRANULOCYTES # BLD AUTO: 0 10*3/MM3 (ref 0–0.05)
IMM GRANULOCYTES NFR BLD AUTO: 0 % (ref 0–0.5)
LDLC SERPL CALC-MCNC: 94 MG/DL (ref 0–100)
LYMPHOCYTES # BLD AUTO: 2.17 10*3/MM3 (ref 0.7–3.1)
LYMPHOCYTES NFR BLD AUTO: 39.3 % (ref 19.6–45.3)
MCH RBC QN AUTO: 28.6 PG (ref 26.6–33)
MCHC RBC AUTO-ENTMCNC: 32.7 G/DL (ref 31.5–35.7)
MCV RBC AUTO: 87.6 FL (ref 79–97)
MONOCYTES # BLD AUTO: 0.52 10*3/MM3 (ref 0.1–0.9)
MONOCYTES NFR BLD AUTO: 9.4 % (ref 5–12)
NEUTROPHILS # BLD AUTO: 2.73 10*3/MM3 (ref 1.7–7)
NEUTROPHILS NFR BLD AUTO: 49.5 % (ref 42.7–76)
NRBC BLD AUTO-RTO: 0 /100 WBC (ref 0–0.2)
PLATELET # BLD AUTO: 286 10*3/MM3 (ref 140–450)
POTASSIUM SERPL-SCNC: 4.2 MMOL/L (ref 3.5–5.2)
PROT SERPL-MCNC: 6.7 G/DL (ref 6–8.5)
RBC # BLD AUTO: 4.51 10*6/MM3 (ref 3.77–5.28)
SODIUM SERPL-SCNC: 143 MMOL/L (ref 136–145)
TRIGL SERPL-MCNC: 103 MG/DL (ref 0–150)
TSH SERPL DL<=0.005 MIU/L-ACNC: 0.69 UIU/ML (ref 0.27–4.2)
VLDLC SERPL CALC-MCNC: 19 MG/DL (ref 5–40)
WBC # BLD AUTO: 5.52 10*3/MM3 (ref 3.4–10.8)

## 2025-06-12 ENCOUNTER — TELEPHONE (OUTPATIENT)
Dept: FAMILY MEDICINE CLINIC | Facility: CLINIC | Age: 25
End: 2025-06-12

## 2025-06-12 NOTE — TELEPHONE ENCOUNTER
Caller: Xiomara Holliday    Relationship to patient: Self    Best call back number: [phone]     Patient is needing: NEEDS A DIFFERENT REFERRAL FOR A DIFFERENT PAIN MANAGEMENT PLACE. THE OTHER IS SCHEDULING MONTHS OUT. NEED A REFERRAL SENT TO THE CLINIC [facility] [address] (A  [facility])

## 2025-06-12 NOTE — TELEPHONE ENCOUNTER
I called the pt and another dr is the one who did the referral so she is going to call there office and get them to send the referral to [facility]

## 2025-07-25 ENCOUNTER — TELEPHONE (OUTPATIENT)
Dept: FAMILY MEDICINE CLINIC | Facility: CLINIC | Age: 25
End: 2025-07-25

## 2025-08-14 ENCOUNTER — OFFICE VISIT (OUTPATIENT)
Dept: FAMILY MEDICINE CLINIC | Facility: CLINIC | Age: 25
End: 2025-08-14
Payer: COMMERCIAL

## 2025-08-14 VITALS
SYSTOLIC BLOOD PRESSURE: 110 MMHG | DIASTOLIC BLOOD PRESSURE: 66 MMHG | WEIGHT: 136 LBS | BODY MASS INDEX: 25.03 KG/M2 | TEMPERATURE: 98.1 F | HEIGHT: 62 IN | HEART RATE: 77 BPM | OXYGEN SATURATION: 99 %

## 2025-08-14 DIAGNOSIS — M51.362 DEGENERATION OF INTERVERTEBRAL DISC OF LUMBAR REGION WITH DISCOGENIC BACK PAIN AND LOWER EXTREMITY PAIN: Primary | ICD-10-CM

## 2025-08-14 DIAGNOSIS — M54.41 CHRONIC MIDLINE LOW BACK PAIN WITH BILATERAL SCIATICA: ICD-10-CM

## 2025-08-14 DIAGNOSIS — M54.42 CHRONIC MIDLINE LOW BACK PAIN WITH BILATERAL SCIATICA: ICD-10-CM

## 2025-08-14 DIAGNOSIS — G89.29 CHRONIC MIDLINE LOW BACK PAIN WITH BILATERAL SCIATICA: ICD-10-CM

## 2025-08-14 RX ORDER — CLONAZEPAM 1 MG/1
1 TABLET ORAL 2 TIMES DAILY PRN
COMMUNITY
Start: 2025-08-06